# Patient Record
Sex: MALE | Race: OTHER
[De-identification: names, ages, dates, MRNs, and addresses within clinical notes are randomized per-mention and may not be internally consistent; named-entity substitution may affect disease eponyms.]

---

## 2018-08-25 VITALS
SYSTOLIC BLOOD PRESSURE: 178 MMHG | DIASTOLIC BLOOD PRESSURE: 77 MMHG | DIASTOLIC BLOOD PRESSURE: 77 MMHG | SYSTOLIC BLOOD PRESSURE: 178 MMHG

## 2018-08-25 NOTE — EKG
[facility]

              [address] [place] [address]

Test Date:    2018               Test Time:    17:39:42

Pat Name:     KIN HUNT       Department:   

Patient ID:   [id number]           Room:          

Gender:       M                        Technician:   CASIMIRO

:          1945               Requested By: AVA BOWDEN

Order Number: [id number]           Reading MD:   Benigno Lawson MD

                                 Measurements

Intervals                              Axis          

Rate:         65                       P:            48

DC:           202                      QRS:          27

QRSD:         84                       T:            62

QT:           398                                    

QTc:          415                                    

                           Interpretive Statements

SINUS RHYTHM

CONSISTENT WITH INFERIOR INFARCT



Electronically Signed On 2018 10:39:28 CDT by Benigno Lawson MD

## 2018-08-25 NOTE — PHYS DOC
Past Medical History


Past Medical History:  Diabetes-Type II, High Cholesterol, Hypertension


Past Surgical History:  Coronary Bypass Surgery, Other


Additional Past Surgical Histo:  Hernia, tooth extractions


Alcohol Use:  Occasionally


Drug Use:  None





Adult General


Chief Complaint


Chief Complaint:  Neck Pain





HPI


HPI





Patient is a 72  year old male who presents with neck pain to the left side is 

now shooting up into his head. The patient states that it is worse when he 

turns his head. They are concerned because he has a history of open-heart 

surgery and they state that his only complaint at that time was pain radiating 

up into the left side of his neck. He denies chest pain, shortness of breath or 

diaphoresis.





Review of Systems


Review of Systems





Constitutional: Denies fever or chills []


Respiratory: Denies cough or shortness of breath []


Cardiovascular: No additional information not addressed in HPI []


GI: Denies abdominal pain, nausea, vomiting, bloody stools or diarrhea []


: Denies dysuria or hematuria []


Musculoskeletal: See history of present illness


Integument: Denies rash or skin lesions []


Neurologic: Denies headache, focal weakness or sensory changes []


Endocrine: Denies polyuria or polydipsia []





All other systems were reviewed and found to be within normal limits, except as 

documented in this note.





Current Medications


Current Medications





Current Medications








 Medications


  (Trade)  Dose


 Ordered  Sig/[facility]  Start Time


 Stop Time Status Last Admin


Dose Admin


 


 Acetaminophen/


 Hydrocodone Bitart


  (Lortab 5/325)  1 tab  1X  ONCE  18 19:15


 18 19:16 DC 18 19:30


1 TAB


 


 Sodium Chloride  1,000 ml @ 


 1,000 mls/hr  1X  ONCE  18 18:30


 18 19:29 DC 18 19:06


1,000 MLS/HR











Allergies


Allergies





Allergies








Coded Allergies Type Severity Reaction Last Updated Verified


 


  No Known Drug Allergies    17 No











Physical Exam


Physical Exam





Constitutional: Well developed, well nourished, no acute distress, non-toxic 

appearance. []


Neck: Tenderness over left trapezius, no gross deformities noted,  point spinal 

tenderness noted


Cardiovascular:Heart rate regular rhythm, no murmur []


Lungs & Thorax:  Bilateral breath sounds clear to auscultation []


Abdomen: Bowel sounds normal, soft, no tenderness, no masses, no pulsatile 

masses. [] 


Skin: Warm, dry, no erythema, no rash. [] 


Neurologic: Alert and oriented X 3, normal motor function, normal sensory 

function, no focal deficits noted. []


Psychologic: Affect normal, judgement normal, mood normal. []





Current Patient Data


Vital Signs





 Vital Signs








  Date Time  Temp Pulse Resp B/P (MAP) Pulse Ox O2 Delivery O2 Flow Rate FiO2


 


18 19:30   20  97 Room Air  


 


18 16:54 97.9 68  178/77 (110)    





 97.9       








Lab Values





 Laboratory Tests








Test


 18


17:30


 


White Blood Count


 7.3 x10^3/uL


(4.0-11.0)


 


Red Blood Count


 3.96 x10^6/uL


(4.30-5.70)  L


 


Hemoglobin


 11.7 g/dL


(13.0-17.5)  L


 


Hematocrit


 35.1 %


(39.0-53.0)  L


 


Mean Corpuscular Volume


 89 fL ()





 


Mean Corpuscular Hemoglobin 30 pg (25-35)  


 


Mean Corpuscular Hemoglobin


Concent 33 g/dL


(31-37)


 


Red Cell Distribution Width


 14.5 %


(11.5-14.5)


 


Platelet Count


 227 x10^3/uL


(140-400)


 


Neutrophils (%) (Auto) 66 % (31-73)  


 


Lymphocytes (%) (Auto) 22 % (24-48)  L


 


Monocytes (%) (Auto) 9 % (0-9)  


 


Eosinophils (%) (Auto) 3 % (0-3)  


 


Basophils (%) (Auto) 1 % (0-3)  


 


Neutrophils # (Auto)


 4.8 x10^3uL


(1.8-7.7)


 


Lymphocytes # (Auto)


 1.6 x10^3/uL


(1.0-4.8)


 


Monocytes # (Auto)


 0.6 x10^3/uL


(0.0-1.1)


 


Eosinophils # (Auto)


 0.2 x10^3/uL


(0.0-0.7)


 


Basophils # (Auto)


 0.1 x10^3/uL


(0.0-0.2)


 


Sodium Level


 133 mmol/L


(136-145)  L


 


Potassium Level


 4.5 mmol/L


(3.5-5.1)


 


Chloride Level


 101 mmol/L


()


 


Carbon Dioxide Level


 22 mmol/L


(21-32)


 


Anion Gap 10 (6-14)  


 


Blood Urea Nitrogen


 32 mg/dL


(8-26)  H


 


Creatinine


 1.4 mg/dL


(0.7-1.3)  H


 


Estimated GFR


(Cockcroft-Gault) 49.5  





 


BUN/Creatinine Ratio 23 (6-20)  H


 


Glucose Level


 377 mg/dL


(70-99)  H


 


Calcium Level


 8.8 mg/dL


(8.5-10.1)


 


Total Bilirubin


 0.4 mg/dL


(0.2-1.0)


 


Aspartate Amino Transferase


(AST) 22 U/L (15-37)





 


Alanine Aminotransferase (ALT)


 44 U/L (16-63)





 


Alkaline Phosphatase


 66 U/L


()


 


Creatine Kinase


 167 U/L


()


 


Creatine Kinase MB (Mass)


 2.2 ng/mL


(0.0-3.6)


 


Creatine Kinase MB Relative


Index 1.3 % (0-4)  





 


Troponin I Quantitative


 < 0.017 ng/mL


(0.000-0.055)


 


Total Protein


 7.7 g/dL


(6.4-8.2)


 


Albumin


 3.6 g/dL


(3.4-5.0)


 


Albumin/Globulin Ratio


 0.9 (1.0-1.7)


L





 Laboratory Tests


18 17:30








 Laboratory Tests


18 17:30














EKG


EKG


[]





Radiology/Procedures


Radiology/Procedures


[]Signed





PATIENT: KIN HUNT ACCOUNT: [id number] MRN#: [id number]


: 1945 LOCATION: ER AGE: 72


SEX: M EXAM DT: 18 ACCESSION#: [id number]


STATUS: REG ER ORD. PHYSICIAN: AAV BOWDEN 


REASON: neck pain


PROCEDURE: CT CERVICAL SPINE WO CONTRAST





 


EXAM: Cervical spine CT without contrast.


 


HISTORY: Pain.


 


TECHNIQUE: Computed tomographic images of the cervical spine were obtained


without contrast. Multiplanar reformatting was performed. 


*One or more of the following individualized dose reduction techniques 


were utilized for this examination:  


1. Automated exposure control.  


2. Adjustment of the mA and/or kV according to patient size.  


3. Use of iterative reconstruction technique.


 


COMPARISON: None.


 


FINDINGS: There is cervical curvature due to thoracic scoliosis. There is 


3 mm anterolisthesis of C4 on C5, 2 mm retrolisthesis of C5 on C6 and 4 mm


anterolisthesis of C7 on T1. There is degenerative endplate remodeling 


with disc space narrowing and osteophytosis primarily at C5-C6 and C6-C7. 


There are multiple endplate Schmorl's nodes. There is advanced facet 


arthropathy at multiple levels. There is no suspicious osseous lesion. 


There is no acute or subacute fracture.


 


At C2-C3, there is a disc bulge. There is mild left facet arthropathy. 


There is mild left foraminal stenosis.


 


At C3-C4, there is a posterior central disc protrusion superimposed on a 


disc bulge and endplate remodeling. There is mild right and moderate left 


facet arthropathy. There is uncovertebral arthropathy. There is no 


stenosis.


 


At C4-C5, there is a posterior central disc protrusion superimposed on a 


disc bulge and endplate osteophytosis. There is mild right and moderate 


left facet arthropathy. There is uncovertebral arthropathy. There is mild 


left foraminal stenosis.


 


At C5-C6, there is a left paracentral disc protrusion superimposed on a 


disc bulge and endplate osteophytosis. There is mild left facet 


arthropathy. There is left greater than right uncovertebral therapy. There


is moderate left foraminal stenosis.


 


At C6-C7, there is a disc bulge and endplate osteophytosis. There is no 


stenosis.


 


IMPRESSION: 


1. Multilevel degenerative change within the cervical spine, resulting in 


stenosis as described above.


2. Cervical curvature due to thoracic scoliosis and mild multilevel 


listhesis.


3. No acute osseous finding.


 


Electronically signed by: Ophelia Haines MD (2018 6:51 PM) [facility]














DICTATED and SIGNED BY:     OPHELIA HAINES MD


DATE:     18 1849





Course & Med Decision Making


Course & Med Decision Making


Pertinent Labs and Imaging studies reviewed. (See chart for details)





[]The patient was given a dose of pain medication in the emergency department.





Dragon Disclaimer


Dragon Disclaimer


This electronic medical record was generated, in whole or in part, using a 

voice recognition dictation system.





Departure


Departure


Impression:  


 Primary Impression:  


 Neck pain


Disposition:  01 HOME, SELF-CARE


Condition:  STABLE


Referrals:  


ANALILIA LEVI MD (PCP)


Patient Instructions:  Degenerative Disk Disease





Additional Instructions:  


Take the pain medication as directed. Do not drive or operate heavy machinery 

while taking this medication. Follow-up with your primary care provider for 

possible referral to neurosurgery. If worsening return to the emergency 

department.


Scripts


Hydrocodone/Apap 5-325 (NORCO 5-325 TABLET) 1 Each Tablet


1 TAB PO PRN Q6HRS PRN for PAIN, #14 TAB 0 Refills


   Prov: AVA BOWDEN         18











AVA BOWDEN Aug 25, 2018 20:22

## 2018-08-25 NOTE — RAD
EXAM: Cervical spine CT without contrast.

 

HISTORY: Pain.

 

TECHNIQUE: Computed tomographic images of the cervical spine were obtained

without contrast. Multiplanar reformatting was performed. 

*One or more of the following individualized dose reduction techniques 

were utilized for this examination:  

1. Automated exposure control.  

2. Adjustment of the mA and/or kV according to patient size.  

3. Use of iterative reconstruction technique.

 

COMPARISON: None.

 

FINDINGS: There is cervical curvature due to thoracic scoliosis. There is 

3 mm anterolisthesis of C4 on C5, 2 mm retrolisthesis of C5 on C6 and 4 mm

anterolisthesis of C7 on T1. There is degenerative endplate remodeling 

with disc space narrowing and osteophytosis primarily at C5-C6 and C6-C7. 

There are multiple endplate Schmorl's nodes. There is advanced facet 

arthropathy at multiple levels. There is no suspicious osseous lesion. 

There is no acute or subacute fracture.

 

At C2-C3, there is a disc bulge. There is mild left facet arthropathy. 

There is mild left foraminal stenosis.

 

At C3-C4, there is a posterior central disc protrusion superimposed on a 

disc bulge and endplate remodeling. There is mild right and moderate left 

facet arthropathy. There is uncovertebral arthropathy. There is no 

stenosis.

 

At C4-C5, there is a posterior central disc protrusion superimposed on a 

disc bulge and endplate osteophytosis. There is mild right and moderate 

left facet arthropathy. There is uncovertebral arthropathy. There is mild 

left foraminal stenosis.

 

At C5-C6, there is a left paracentral disc protrusion superimposed on a 

disc bulge and endplate osteophytosis. There is mild left facet 

arthropathy. There is left greater than right uncovertebral therapy. There

is moderate left foraminal stenosis.

 

At C6-C7, there is a disc bulge and endplate osteophytosis. There is no 

stenosis.

 

IMPRESSION: 

1. Multilevel degenerative change within the cervical spine, resulting in 

stenosis as described above.

2. Cervical curvature due to thoracic scoliosis and mild multilevel 

listhesis.

3. No acute osseous finding.

 

Electronically signed by: Ophelia Britt MD (8/25/2018 6:51 PM) [facility]

## 2019-10-21 NOTE — RAD
EXAM: Renal sonogram.

 

HISTORY: Renal insufficiency.

 

TECHNIQUE: Sonographic imaging of the kidneys and bladder was performed.

 

COMPARISON: 5/18/2016.

 

FINDINGS: The right kidney measures 12.8 cm bpqd-lp-nmoa. The left kidney 

measures 12.8 cm lvzr-ju-ggua. No solid or cystic renal lesion is seen. 

There is no hydronephrosis. The urinary bladder is unremarkable. The post 

void bladder residual is 14 cc.

 

IMPRESSION:

1. Unremarkable kidneys.

2. Small post void bladder residual of 14 cc.

 

Electronically signed by: Ophelia Britt MD (10/21/2019 8:55 AM) [place] [id number]

## 2019-11-21 NOTE — CARD
MR#: [id number]

Account#: [id number]

Accession#: [id number]

Date of Study: 11/21/2019

Ordering Physician: МАРИНА BALDERAS, 

Referring Physician: МАРИНА BALDERAS, 

Tech: Alee Jimenez





--------------- APPROVED REPORT --------------





EXAM: Two-dimensional and M-mode echocardiogram with Doppler and color Doppler.



Other Information 

Quality : AverageHR: 67bpm

Technically limited study due to  body habitus.



INDICATION

CAD 



2D DIMENSIONS 

RVDd3.0 (2.9-3.5cm)IVSd1.3 (0.7-1.1cm)

Aortic Root(2D)2.6 (2.0-3.7cm)LVDd4.6 (3.9-5.9cm)

LVOT Diameter2.0 (1.8-2.4cm)PWd0.8 (0.7-1.1cm)

LVDs3.2 (2.5-4.0cm)FS (%) 32.1 %

SV60.2 mlLVEF(%)60.4 (>50%)



Aortic Valve

AoV Peak Jan.138.3cm/sAoV VTI27.8cm

AO Peak GR.7.6mmHgLVOT Peak Jan.106.1cm/s

LVOT  VTI 21.65cmAO Mean GR.5mmHg

EMERITA (VMAX)1.11op8VPY   (VTI)2.41cm2



Mitral Valve

MV E Weojxpck18.9cm/sMV DECEL [id number]

MV A Fgylorit87.5cm/sMV USQ09di

E/A  Ratio1.0MVA (PHT)3.17cm2



TDI

E/Lateral E'9.7E/Medial E'16.3



Pulmonary Valve

PV Peak Nvedgoqw96.6cm/sPV Peak Grad.4mmHg



Tricuspid Valve

TR P. Fixzfzrb550zh/sRAP BPOLLNTM9zfJb

TR Peak Gr.63yyVhFAPF66coMo



Pulmonary Vein

S1 Lrsasuzz44.1cm/sD2 Evlikohh33.5cm/s

PVa xcjuhqus033pruj



 LEFT VENTRICLE 

The left ventricle is normal size. There is mild septal left ventricular hypertrophy. The left ventri
cular systolic function is normal and the ejection fraction is within normal range. The Ejection Frac
tion is 60-65%. There is normal LV segmental wall motion. Transmitral Doppler flow pattern is Grade I
I-pseudonormal filling dynamics.



 RIGHT VENTRICLE 

The right ventricle is normal size. The right ventricular systolic function is normal.



 ATRIA 

The left atrium is mildly to moderately dilated. The right atrium size is normal. The interatrial sep
patric is intact with no evidence for an atrial septal defect or patent foramen ovale as noted on 2-D or
 Doppler imaging.



 AORTIC VALVE 

The aortic valve is mildly thickened but opens well. Doppler and Color Flow revealed no significant a
ortic regurgitation. There is no significant aortic valvular stenosis.



 MITRAL VALVE 

The mitral valve is thickened but opens well. There is no evidence of mitral valve prolapse. There is
 no mitral valve stenosis. Doppler and Color-flow revealed mild mitral regurgitation.



 TRICUSPID VALVE 

The tricuspid valve is normal in structure and function. Doppler and Color Flow revealed mild tricusp
id regurgitation with an estimated PAP of 31 mmHg. There is no tricuspid valve stenosis. 



 PULMONIC VALVE 

Doppler and Color Flow revealed no pulmonic valvular regurgitation. There is no pulmonic valvular judi
nosis.



 GREAT VESSELS 

The aortic root is normal in size. The ascending aorta is normal in size. The IVC is normal in size a
nd collapses >50% with inspiration.



 PERICARDIAL EFFUSION 

There is no pleural effusion. There is no evidence of significant pericardial effusion.



Critical Notification

Critical Value: No



<Conclusion>

The left ventricular systolic function is normal and the ejection fraction is within normal range. Th
e Ejection Fraction is 60-65%.

There is normal LV segmental wall motion.

Doppler and Color Flow revealed mild tricuspid regurgitation with an estimated PAP of 31 mmHg.



Signed by : Марина Balderas, 

Electronically Approved : 11/21/2019 11:26:18

## 2020-06-02 NOTE — RAD
MR#: [id number]

Account#: [id number]

Accession#: [id number]

Date of Study: 06/02/2020

Ordering Physician: МАРИНА BALDERAS, 

Referring Physician: МАРИНА BALDERAS, 

Tech: Nilson Garcia MBA, RDMS, RVT, RDCS, RTR





--------------- APPROVED REPORT --------------





Patient Location: OUT-PATIENT



Indications

AAA

htn



Duplex Results

A/PTransverseLongitudinal

Proximal Aorta                  2.4cm1.3cm

Mid Aorta                       1.7cm1.7cm

Distal Aorta                    1.8cm1.2cm

Rt. Common Iliac Artery1.2cm

Lt. Common Iliac Artery        .8cm



Doppler

                                VelocityWaveform

Proximal Aorta                  115.0 cm/sec

Aorta Mid.                      185.0 cm/sec

Distal Aorta                    189.0 cm/sec



Findings

Technically limited study.  Grossly no obvious evidence of abdominal aortic aneurysm.  Measurements a
s noted above.  Mildly elevated velocities in the aorta but no focal obstruction identified.



Critical Notification

Critical Value: No



<Conclusion>

1.  No significant abdominal aortic aneurysm noted.



Signed by : Марина Balderas, 

Electronically Approved : 06/02/2020 10:52:23

## 2020-11-19 ENCOUNTER — HOSPITAL ENCOUNTER (OUTPATIENT)
Dept: HOSPITAL 61 - ECHO | Age: 75
End: 2020-11-19
Attending: INTERNAL MEDICINE
Payer: COMMERCIAL

## 2020-11-19 DIAGNOSIS — I11.9: ICD-10-CM

## 2020-11-19 DIAGNOSIS — I65.23: Primary | ICD-10-CM

## 2020-11-19 DIAGNOSIS — I77.3: ICD-10-CM

## 2020-11-19 DIAGNOSIS — Q25.46: ICD-10-CM

## 2020-11-19 PROCEDURE — A9500 TC99M SESTAMIBI: HCPCS

## 2020-11-19 PROCEDURE — 78452 HT MUSCLE IMAGE SPECT MULT: CPT

## 2020-11-19 PROCEDURE — 93306 TTE W/DOPPLER COMPLETE: CPT

## 2020-11-19 PROCEDURE — 93880 EXTRACRANIAL BILAT STUDY: CPT

## 2020-11-19 PROCEDURE — 93017 CV STRESS TEST TRACING ONLY: CPT

## 2020-11-19 NOTE — CARD
MR#: [id number]

Account#: [id number]

Accession#: [id number]

Date of Study: 11/19/2020

Ordering Physician: МАРИНА BALDERAS, 

Referring Physician: МАРИНА BALDERAS, 

Tech: Carolina Stiles





--------------- APPROVED REPORT --------------





EXAM: Two-dimensional and M-mode echocardiogram with Doppler and color Doppler.



Other Information 

Quality : AverageHR: 62bpm



INDICATION

Cardiac Disease: CAD 



RISK FACTORS

Hypertension 

Hyperlipidemia

Diabetes



2D DIMENSIONS 

Left Atrium(2D)4.0 (1.6-4.0cm)IVSd1.2 (0.7-1.1cm)

Aortic Root(2D)3.2 (2.0-3.7cm)LVDd4.5 (3.9-5.9cm)

LVOT Diameter2.0 (1.8-2.4cm)PWd1.1 (0.7-1.1cm)

LVDs2.8 (2.5-4.0cm)FS (%) 37.4 %

SV61.4 mlLVEF(%)67.6 (>50%)



Aortic Valve

AoV Peak Jan.125.0cm/sAoV VTI25.6cm

AO Peak GR.6.3mmHgLVOT Peak Jan.92.1cm/s

LVOT  VTI 21.64cmAO Mean GR.4mmHg

EMERITA (VMAX)1.49wd9VJN   (VTI)2.62cm2



Mitral Valve

MV E Eineqaco38.5cm/sMV DECEL WVAN202wv

MV A Bqwwpymh10.5cm/sMV UHZ36ut

E/A  Ratio0.8MVA (PHT)3.72cm2



TDI

E/Lateral E'9.7E/Medial E'18.1



Pulmonary Valve

PV Peak Ayrjkbbf305.0cm/sPV Peak Grad.5mmHg



Tricuspid Valve

TR P. Wqruscbr082rl/sRAP EESWVHRT6awJf

TR Peak Gr.51xhLdZDOK71rrPn



Pulmonary Vein

S1 Nmmyqkxc08.3cm/sD2 Yiyjaxwl60.7cm/s

PVa djmvsulc772dunn



 LEFT VENTRICLE 

The left ventricle is normal size. There is borderline to mild concentric left ventricular hypertroph
y. The left ventricular systolic function is normal. The Ejection Fraction is 55-60%. There is normal
 LV segmental wall motion. Transmitral Doppler flow pattern is Grade I-abnormal relaxation pattern.



 RIGHT VENTRICLE 

The right ventricle is normal size. There is normal right ventricular wall thickness. The right ventr
icular systolic function is normal.



 ATRIA 

The left atrium size is normal. The right atrium size is normal. The interatrial septum is intact wit
h no evidence for an atrial septal defect or patent foramen ovale as noted on 2-D or Doppler imaging.




 AORTIC VALVE 

The aortic valve is thickened but opens well. Doppler and Color Flow revealed no significant aortic r
egurgitation. There is no significant aortic valvular stenosis. Calculated aortic valve area is 2.58 
cm2 with maximum pressure gradient of 8 mmHg and mean pressure gradient of 4 mmHg.



 MITRAL VALVE 

The mitral valve is normal in structure and function. There is no evidence of mitral valve prolapse. 
There is no mitral valve stenosis. Doppler and Color-flow revealed trace mitral regurgitation.



 TRICUSPID VALVE 

The tricuspid valve is normal in structure and function. Doppler and Color Flow revealed trace tricus
pid regurgitation with an estimated PAP of 33 mmHg. There is no tricuspid valve stenosis.



 PULMONIC VALVE 

The pulmonic valve is not well visualized. Doppler and Color Flow revealed trace pulmonic valvular re
gurgitation.



 GREAT VESSELS 

The aortic root is normal in size. The IVC was not well visualized.



 PERICARDIAL EFFUSION 

There is no evidence of significant pericardial effusion.



Critical Notification

Critical Value: No



<Conclusion>

The left ventricular systolic function is normal.

The Ejection Fraction is 55-60%.

There is normal LV segmental wall motion.

Transmitral Doppler flow pattern is Grade I-abnormal relaxation pattern.

Trace mitral regurgitation.

Trace tricuspid regurgitation with an estimated PAP of 33 mmHg.

There is no evidence of significant pericardial effusion.



Signed by : Steven Gupta, 

Electronically Approved : 11/19/2020 16:26:06

## 2020-11-19 NOTE — RAD
MR#: [id number]

Account#: [id number]

Accession#: [id number]

Date of Study: 11/19/2020

Ordering Physician: МАРИНА BALDERAS, 

Referring Physician: KYLE ARRINGTON Tech: RT CARMELITA Camarillo) (N)





--------------- APPROVED REPORT --------------





Test Type:          Pharmacological

Stress Nurse/Tech: Cassie Weller RN

Test Indications: CAD

Cardiac History: Hypertension, Diabetes,CAD

Medications:     See Electronic Medical Record

Medical History: See Electronic Medical Record

Resting ECG:     SR

Resting Heart Rate: 60 bpm

Resting Blood Pressure: 161/68mmHg

Pretest Chest Pain: No chest pain



Nurse/Tech Notes

S1,S2 and lungs clear to auscultation.

Consent: The procedure was explained to the patient in lay terms. Informed consent was witnessed. Erwin
eout was entered into [employer]. History and Stress Test performed by RT Rabia (R) (N)



Pharm. Details

Pharmacologic stress testing was performed using 0.4mg per 5ml of regadenoson given intravenously ove
r 7-10 seconds.



Stress Symptoms

Dyspnea



POST EXERCISE

Reason for Termination: Infusion complete

Target HR: Yes

Max HR: 144 bpm

119% of Maximum Predicted HR: 121 bpm

Max Blood Pressure: 161/68mmHg

Blood Pressure response to exercise: Normal blood pressure response during stress.

Heart Rate response to exercise: WNL

Chest Pain: No. 

Arrhythmia: No. 

ST Change: No. 



INTERPRETATION

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  No ischemic morris
es at peak stress.  No arrhythmias.



Imaging Protocol

IMAGE PROTOCOL: Rest Tc-99m/stress Tc-99m 1 day



Rest:            Stress:         Viability:   

Radiopharm.Tc99m YpnkaaolnHk53s Sestamibi

Dose10.6mCi            32mCi            

Duration    15min.           10min.           

Img Date  11/19/2020 11/19/2020      

Inj-Img Glvk51dec.           60min.           



Rest Admin Site:IV - Right HandAdministrator:MICHAEL Ibrahim, ARRT (R)(N)

Stress Admin Site: IV - Right HandAdministrator: MICHAEL Ibrahim, ARRT (R)(N)



STRESS DATA

End Diast. Vol.141.0mlAv. Heart Rate66.0bpm

End Syst. Vol.61.0mlCO Index BSA0.0L/min

Myocardial Iolg027.0gEject. Gauxiehx77.0%



Stress Rates

Pk. Fill Rate2.32EDV/secLVtime Pk. Fill 242.42msec

Pk. Empty Rate2.73ESV/secLVtime Pk. Ifmno108.45msec

1/3 Pk. Fill0.38EDV/sec



Stress Scores

Regional WT1.00Summed WT9.00

Regional WM0.00Summed WM15.00



LV Perfusion

Scintigraphic images moderate fixed defect involving the inferolateral wall consistent with previous 
myocardial infarction without any reversibility.



Wall Motion

Abnormal septal motion most probably secondary to postoperative state.  The ejection fraction is calc
ulated at 48%.



LV Perf. Quant

17 Seg. SSS14.00

17 Seg. SRS12.00

17 Seg. SDS3.00

Stress Defect Extent (% LAD)15.60Rest Defect Extent (% LAD)11.30Rev. Defect Extent (% LAD)0.00

Stress Defect Extent (% LCX) 42.50Rest Defect Extent (% LCX)53.80Rev. Defect Extent (% LCX)0.00

Stress Defect Extent (% RCA)25.60Rest Defect Extent (% RCA)20.00Rev. Defect Extent (% RCA)0.00

Stress Defect Extent (% CASSIE)25.00Rest Defect Extent (% CASSIE)25.20Rev. Defect Extent (% CASISE)0.00



Conclusion

1. Regadenoson cardioisotope stress test showed moderate sized inferolateral wall infarct without any
 ischemia.

2. Abnormal septal motion most probably secondary to postoperative state.  The ejection fraction is c
alculated at 48%.

3. Low risk for cardiac events.



Signed by : Steven Gupta, 

Electronically Approved : 11/19/2020 16:08:03

## 2020-11-19 NOTE — RAD
MR#: [id number]

Account#: [id number]

Accession#: [id number]

Date of Study: 11/19/2020

Ordering Physician: МАРИНА BALDERAS,

Referring Physician: МАРИНА BALDERAS,

Tech: Carolina Roe, OLGA, RVT, RTR





--------------- APPROVED REPORT --------------





Patient Location: OUT-PATIENT

Laterality:Bilateral



Indications

Carotid Artery Disease



Doppler Spectral Velocity Analysis

Right   Left    

pCCA     152/18 cm/spCCA     165/24 cm/s

mCCA     102/13 cm/smCCA     124/17 cm/s

dCCA     105/18 cm/sdCCA     118/16 cm/s

Bulb     93/15 cm/sBulb     131/21 cm/s

ECA      143/10 cm/sECA      108/10 cm/s

pICA     91/20 cm/spICA     109/24 cm/s

Jordy     93/26 cm/smICA     114/25 cm/s

dICA     113/24 cm/sdICA     102/25 cm/s

Vert.    17/5 cm/sVert.    61/17 cm/s

ICA/CCA  1.00ICA/CCA  0.91



Findings

Grayscale images demonstrate bilateral intimal hyperplasia involving the common carotid, external and
 internal carotid vessels.  Grayscale images demonstrate mild plaque diffusely.



Based on velocity criteria there is moderate stenosis involving the common carotid arteries but this 
is likely related to tortuosity rather than true plaque obstruction.



Overall 0 to less than 50% stenosis based on velocity criteria in the internal carotid arteries bilat
erally.



The right vertebral artery is not well visualized and may either be occluded or severely diseased.  T
he left vertebral artery demonstrates normal vertebral velocities in an antegrade fashion.



Critical Notification

Critical Value: No



<Conclusion>

1.  No significant internal carotid artery disease bilaterally

2.  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

3.  Tortuous proximal common carotid arteries but no clear obstruction evident.



Signed by : Марина Balderas, 

Electronically Approved : 11/19/2020 10:12:37

## 2020-11-30 ENCOUNTER — HOSPITAL ENCOUNTER (OUTPATIENT)
Dept: HOSPITAL 61 - LAB | Age: 75
End: 2020-11-30
Attending: INTERNAL MEDICINE
Payer: COMMERCIAL

## 2020-11-30 DIAGNOSIS — E11.21: ICD-10-CM

## 2020-11-30 DIAGNOSIS — I12.9: Primary | ICD-10-CM

## 2020-11-30 DIAGNOSIS — D64.9: ICD-10-CM

## 2020-11-30 DIAGNOSIS — N18.2: ICD-10-CM

## 2020-11-30 DIAGNOSIS — E11.22: ICD-10-CM

## 2020-11-30 DIAGNOSIS — N17.9: ICD-10-CM

## 2020-11-30 DIAGNOSIS — E55.9: ICD-10-CM

## 2020-11-30 DIAGNOSIS — R80.9: ICD-10-CM

## 2020-11-30 LAB
ALBUMIN SERPL-MCNC: 3.3 G/DL (ref 3.4–5)
ANION GAP SERPL CALC-SCNC: 12 MMOL/L (ref 6–14)
BUN SERPL-MCNC: 24 MG/DL (ref 8–26)
CALCIUM SERPL-MCNC: 8.8 MG/DL (ref 8.5–10.1)
CHLORIDE SERPL-SCNC: 107 MMOL/L (ref 98–107)
CO2 SERPL-SCNC: 27 MMOL/L (ref 21–32)
CREAT SERPL-MCNC: 1.2 MG/DL (ref 0.7–1.3)
GFR SERPLBLD BASED ON 1.73 SQ M-ARVRAT: 58.7 ML/MIN
GLUCOSE SERPL-MCNC: 110 MG/DL (ref 70–99)
PHOSPHATE SERPL-MCNC: 3.6 MG/DL (ref 2.6–4.7)
POTASSIUM SERPL-SCNC: 3.8 MMOL/L (ref 3.5–5.1)
SODIUM SERPL-SCNC: 146 MMOL/L (ref 136–145)

## 2020-11-30 PROCEDURE — 80069 RENAL FUNCTION PANEL: CPT

## 2020-11-30 PROCEDURE — 36415 COLL VENOUS BLD VENIPUNCTURE: CPT

## 2021-01-17 ENCOUNTER — HOSPITAL ENCOUNTER (EMERGENCY)
Dept: HOSPITAL 61 - ER | Age: 76
Discharge: HOME | End: 2021-01-17
Payer: COMMERCIAL

## 2021-01-17 VITALS — SYSTOLIC BLOOD PRESSURE: 190 MMHG | DIASTOLIC BLOOD PRESSURE: 79 MMHG

## 2021-01-17 VITALS — BODY MASS INDEX: 27.64 KG/M2 | HEIGHT: 66 IN | WEIGHT: 171.96 LBS

## 2021-01-17 DIAGNOSIS — I10: ICD-10-CM

## 2021-01-17 DIAGNOSIS — R20.2: ICD-10-CM

## 2021-01-17 DIAGNOSIS — E78.00: ICD-10-CM
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
## 2021-01-17 DIAGNOSIS — Z98.890: ICD-10-CM
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
## 2021-01-17 DIAGNOSIS — S29.011A: Primary | ICD-10-CM

## 2021-01-17 DIAGNOSIS — F17.200: ICD-10-CM
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
## 2021-01-17 DIAGNOSIS — E11.9: ICD-10-CM

## 2021-01-17 LAB
% BANDS: 7 % (ref 0–9)
% LYMPHS: 6 % (ref 24–48)
% MONOS: 6 % (ref 0–10)
% SEGS: 81 % (ref 35–66)
ALBUMIN SERPL-MCNC: 3.3 G/DL (ref 3.4–5)
ALBUMIN/GLOB SERPL: 1 {RATIO} (ref 1–1.7)
ALP SERPL-CCNC: 67 U/L (ref 46–116)
ALT SERPL-CCNC: 97 U/L (ref 16–63)
ANION GAP SERPL CALC-SCNC: 14 MMOL/L (ref 6–14)
AST SERPL-CCNC: 50 U/L (ref 15–37)
BASOPHILS # BLD AUTO: 0.1 X10^3/UL (ref 0–0.2)
BASOPHILS NFR BLD: 0 % (ref 0–3)
BILIRUB SERPL-MCNC: 0.7 MG/DL (ref 0.2–1)
BUN SERPL-MCNC: 21 MG/DL (ref 8–26)
BUN/CREAT SERPL: 14 (ref 6–20)
CALCIUM SERPL-MCNC: 8.8 MG/DL (ref 8.5–10.1)
CHLORIDE SERPL-SCNC: 99 MMOL/L (ref 98–107)
CO2 SERPL-SCNC: 24 MMOL/L (ref 21–32)
CREAT SERPL-MCNC: 1.5 MG/DL (ref 0.7–1.3)
EOSINOPHIL NFR BLD: 0 % (ref 0–3)
EOSINOPHIL NFR BLD: 0 X10^3/UL (ref 0–0.7)
ERYTHROCYTE [DISTWIDTH] IN BLOOD BY AUTOMATED COUNT: 14.4 % (ref 11.5–14.5)
GFR SERPLBLD BASED ON 1.73 SQ M-ARVRAT: 45.4 ML/MIN
GLUCOSE SERPL-MCNC: 310 MG/DL (ref 70–99)
HCT VFR BLD CALC: 37.4 % (ref 39–53)
HGB BLD-MCNC: 12.7 G/DL (ref 13–17.5)
LYMPHOCYTES # BLD: 1.1 X10^3/UL (ref 1–4.8)
LYMPHOCYTES NFR BLD AUTO: 7 % (ref 24–48)
MAGNESIUM SERPL-MCNC: 1.6 MG/DL (ref 1.8–2.4)
MCH RBC QN AUTO: 31 PG (ref 25–35)
MCHC RBC AUTO-ENTMCNC: 34 G/DL (ref 31–37)
MCV RBC AUTO: 92 FL (ref 79–100)
MONO #: 1.2 X10^3/UL (ref 0–1.1)
MONOCYTES NFR BLD: 8 % (ref 0–9)
NEUT #: 12.8 X10^3/UL (ref 1.8–7.7)
NEUTROPHILS NFR BLD AUTO: 84 % (ref 31–73)
PLATELET # BLD AUTO: 156 X10^3/UL (ref 140–400)
PLATELET # BLD EST: ADEQUATE 10*3/UL
POTASSIUM SERPL-SCNC: 3.9 MMOL/L (ref 3.5–5.1)
PROT SERPL-MCNC: 6.7 G/DL (ref 6.4–8.2)
RBC # BLD AUTO: 4.09 X10^6/UL (ref 4.3–5.7)
SODIUM SERPL-SCNC: 137 MMOL/L (ref 136–145)
WBC # BLD AUTO: 15.2 X10^3/UL (ref 4–11)

## 2021-01-17 PROCEDURE — 71045 X-RAY EXAM CHEST 1 VIEW: CPT

## 2021-01-17 PROCEDURE — 85025 COMPLETE CBC W/AUTO DIFF WBC: CPT

## 2021-01-17 PROCEDURE — 85007 BL SMEAR W/DIFF WBC COUNT: CPT

## 2021-01-17 PROCEDURE — 84484 ASSAY OF TROPONIN QUANT: CPT

## 2021-01-17 PROCEDURE — 83735 ASSAY OF MAGNESIUM: CPT

## 2021-01-17 PROCEDURE — 36415 COLL VENOUS BLD VENIPUNCTURE: CPT

## 2021-01-17 PROCEDURE — 85379 FIBRIN DEGRADATION QUANT: CPT

## 2021-01-17 PROCEDURE — 93005 ELECTROCARDIOGRAM TRACING: CPT

## 2021-01-17 PROCEDURE — 80053 COMPREHEN METABOLIC PANEL: CPT

## 2021-01-17 PROCEDURE — 99285 EMERGENCY DEPT VISIT HI MDM: CPT

## 2021-01-17 PROCEDURE — 96374 THER/PROPH/DIAG INJ IV PUSH: CPT

## 2021-01-17 PROCEDURE — 71275 CT ANGIOGRAPHY CHEST: CPT

## 2021-01-17 PROCEDURE — 96361 HYDRATE IV INFUSION ADD-ON: CPT

## 2021-01-17 NOTE — RAD
Study: CT CHEST WITH CONTRAST - PULMONARY ANGIOGRAM



History: Right-sided chest pain. Pulmonary embolism.



Comparison: None.



Technique:  Helical CT of the chest performed after the administration of 80 cc Omnipaque 350 intrave
nous contrast and timed for angiographic evaluation of the pulmonary arteries per PE protocol. Corona
l and sagittal 3D MIP reformations were obtained.



One or more of the following individualized dose reduction techniques were utilized for this examinat
ion:  



1. Automated exposure control

2. Adjustment of the mA and/or kV according to patient size

3. Use of iterative reconstruction technique.



Findings: 



Pulmonary Arteries: No main, lobar or segmental pulmonary embolism. Upper limits of normal main pulmo
nary artery caliber at 3 cm.



Heart/Systemic Vasculature: Extensive calcified and noncalcified atheromatous plaque to include invol
vement of the coronary arteries. Status post coronary artery bypass. No CT manifestations of overt ri
ght heart strain. The left vertebral artery originates from the arch and is well opacified. The right
 vertebral artery is not able to be visualized.



Mediastinum: Scattered granulomas. Prominent subcarinal lymph node without internal mineralization me
asures 1.5 cm short axis.



Lungs: Scattered atelectasis. There may be some mild subpleural fibrosis. No suspicious pulmonary nod
ule based on size. No pleural effusion or pneumothorax.



Neck/Axilla/Body Wall: No significant abnormality.



Upper Abdomen: The visualized gallbladder is mildly distended but without apparent wall thickening.



Bones: Scattered degenerative changes with involvement of the lower cervical spine more so than the t
horacic spine. No rib fracture is identified to account for the patient's symptoms.



Miscellaneous: None.



IMPRESSION: 



1.  No pulmonary embolism or other acute abnormality is identified to explain the patient's chest lucie
n.

2.  Scattered atelectasis and some potential subpleural fibrosis. No confluent infiltrate to suggest 
pneumonia.

3.  Prominent subcarinal lymph node measuring up to 1.5 cm short axis. This is indeterminant but a re
active etiology is favored given the absence of lymph node enlargement elsewhere.

4.  Extensive atheromatous plaque without aortic dissection or aneurysmal dilatation. The left verteb
ral artery originates from the arch and is well opacified. The proximal portion of the right vertebra
l artery is not able to be delineated. This could be chronic/due to nondominance though correlate for
 any symptoms in this vascular distribution.



Electronically signed by: JENNIFER VIZCARRA MD (1/17/2021 12:10 PM) [facility]

## 2021-01-17 NOTE — EKG
[facility]

              [address] [place] [address]

Test Date:    2021               Test Time:    09:09:55

Pat Name:     KIN HUNT       Department:   

Patient ID:   [id number]           Room:          

Gender:       M                        Technician:   

:          [id number]               Requested By: LILI CONROY

Order Number: [id number]           Reading MD:     

                                 Measurements

Intervals                              Axis          

Rate:         82                       P:            49

VT:           192                      QRS:          57

QRSD:         94                       T:            52

QT:           358                                    

QTc:          421                                    

                           Interpretive Statements

SINUS RHYTHM

QRS(T) CONTOUR ABNORMALITY

CONSISTENT WITH INFERIOR INFARCT

PROBABLY OLD

ABNORMAL ECG

RI6.02

No previous ECG available for comparison

## 2021-01-17 NOTE — PHYS DOC
Past Medical History


Past Medical History:  Diabetes-Type II, High Cholesterol, Hypertension


Past Surgical History:  Coronary Bypass Surgery, Other


Additional Past Surgical Histo:  Hernia, tooth extractions


Smoking Status:  Former Smoker


Alcohol Use:  Occasionally


Drug Use:  None





Adult General


Chief Complaint


Chief Complaint:  CHEST PAIN





HPI


HPI





Patient is a 75  year old male with an extensive past medical history including 

significant cardiovascular and endovascular disease now presenting emergency 

department complaining of new onset of chest pain.  Patient states he has been 

having the same chest pain for approximately 3 months described as an aching 

pain in the right lateral chest which is worse on inspiration.  Patient states 

that an aching sensation.  Denies any changes with the last few weeks.  Cannot 

tell me why he decided to come in this morning.  Does states that he has been 

seen by his cardiologist over the last week for the same symptoms and work-up is

negative.  Denies any dizziness, headaches, fever, chills, cough, nausea or 

vomiting





Review of Systems


Review of Systems





Constitutional: Denies fever or chills []


Eyes: Denies change in visual acuity, redness, or eye pain []


HENT: Denies nasal congestion or sore throat []


Respiratory: Denies cough or shortness of breath []


Cardiovascular: No additional information not addressed in HPI []


GI: Denies abdominal pain, nausea, vomiting, bloody stools or diarrhea []


:  Denies dysuria or hematuria []


Musculoskeletal: Denies back pain or joint pain []


Integument: Denies rash or skin lesions []


Neurologic: Denies headache, focal weakness or sensory changes []


Endocrine: Denies polyuria or polydipsia []





All other systems were reviewed and found to be within normal limits, except as 

documented in this note.





Current Medications


Current Medications





Current Medications








 Medications


  (Trade)  Dose


 Ordered  Sig/Jillian  Start Time


 Stop Time Status Last Admin


Dose Admin


 


 Info


  (CONTRAST GIVEN


 -- Rx MONITORING)  1 each  PRN DAILY  PRN  1/17/21 11:45


 1/19/21 11:44   





 


 Iohexol


  (Omnipaque 350


 Mg/ml)  80 ml  1X  ONCE  1/17/21 11:30


 1/17/21 11:31 DC 1/17/21 11:50


80 ML


 


 Morphine Sulfate


  (Morphine


 Sulfate)  4 mg  1X  ONCE  1/17/21 11:00


 1/17/21 11:01 DC 1/17/21 11:12


4 MG


 


 Sodium Chloride  500 ml @ 


 500 mls/hr  1X  ONCE  1/17/21 10:15


 1/17/21 11:14 DC 1/17/21 10:14


500 MLS/HR











Allergies


Allergies





Allergies








Coded Allergies Type Severity Reaction Last Updated Verified


 


  No Known Drug Allergies    5/24/17 No











Physical Exam


Physical Exam





Constitutional: Well developed, well nourished, no acute distress, non-toxic 

appearance. []


HENT: Normocephalic, atraumatic, bilateral external ears normal, oropharynx 

moist, no oral exudates, nose normal. []


Eyes: PERRLA, EOMI, conjunctiva normal, no discharge. [] 


Neck: Normal range of motion, no tenderness, supple, no stridor. [] 


Cardiovascular:Heart rate regular rhythm, no murmur


Lungs & Thorax:  Bilateral breath sounds clear to auscultation.  Right anterior 

chest wall tenderness over the right fifth and sixth intercostal space


Abdomen: Bowel sounds normal, soft, no tenderness, no masses, no pulsatile 

masses. [] 


Skin: Warm, dry, no erythema, no rash. [] 


Back: No tenderness, no CVA tenderness. [] 


Extremities: No tenderness, no cyanosis, no clubbing, ROM intact, no edema. [] 


Neurologic: Alert and oriented X 3, normal motor function, normal sensory 

function, no focal deficits noted. []


Psychologic: Affect normal, judgement normal, mood normal. []





Current Patient Data


Vital Signs





                                   Vital Signs








  Date Time  Temp Pulse Resp B/P (MAP) Pulse Ox O2 Delivery O2 Flow Rate FiO2


 


1/17/21 11:12   18  94 Room Air  


 


1/17/21 10:11  74  190/79 (116)    


 


1/17/21 09:16 98.2       





 98.2       








Lab Values





                                Laboratory Tests








Test


 1/17/21


09:12


 


White Blood Count


 15.2 x10^3/uL


(4.0-11.0)  H


 


Red Blood Count


 4.09 x10^6/uL


(4.30-5.70)  L


 


Hemoglobin


 12.7 g/dL


(13.0-17.5)  L


 


Hematocrit


 37.4 %


(39.0-53.0)  L


 


Mean Corpuscular Volume


 92 fL ()





 


Mean Corpuscular Hemoglobin 31 pg (25-35)  


 


Mean Corpuscular Hemoglobin


Concent 34 g/dL


(31-37)


 


Red Cell Distribution Width


 14.4 %


(11.5-14.5)


 


Platelet Count


 156 x10^3/uL


(140-400)


 


Neutrophils (%) (Auto) 84 % (31-73)  H


 


Lymphocytes (%) (Auto) 7 % (24-48)  L


 


Monocytes (%) (Auto) 8 % (0-9)  


 


Eosinophils (%) (Auto) 0 % (0-3)  


 


Basophils (%) (Auto) 0 % (0-3)  


 


Neutrophils # (Auto)


 12.8 x10^3/uL


(1.8-7.7)  H


 


Lymphocytes # (Auto)


 1.1 x10^3/uL


(1.0-4.8)


 


Monocytes # (Auto)


 1.2 x10^3/uL


(0.0-1.1)  H


 


Eosinophils # (Auto)


 0.0 x10^3/uL


(0.0-0.7)


 


Basophils # (Auto)


 0.1 x10^3/uL


(0.0-0.2)


 


Segmented Neutrophils % 81 % (35-66)  H


 


Band Neutrophils % 7 % (0-9)  


 


Lymphocytes % 6 % (24-48)  L


 


Monocytes % 6 % (0-10)  


 


Platelet Estimate


 Adequate


(ADEQUATE)


 


D-Dimer ([place])


 2.43 ug/mlFEU


(0.00-0.50)  H


 


Sodium Level


 137 mmol/L


(136-145)


 


Potassium Level


 3.9 mmol/L


(3.5-5.1)


 


Chloride Level


 99 mmol/L


()


 


Carbon Dioxide Level


 24 mmol/L


(21-32)


 


Anion Gap 14 (6-14)  


 


Blood Urea Nitrogen


 21 mg/dL


(8-26)


 


Creatinine


 1.5 mg/dL


(0.7-1.3)  H


 


Estimated GFR


(Cockcroft-Gault) 45.4  





 


BUN/Creatinine Ratio 14 (6-20)  


 


Glucose Level


 310 mg/dL


(70-99)  H


 


Calcium Level


 8.8 mg/dL


(8.5-10.1)


 


Magnesium Level


 1.6 mg/dL


(1.8-2.4)  L


 


Total Bilirubin


 0.7 mg/dL


(0.2-1.0)


 


Aspartate Amino Transferase


(AST) 50 U/L (15-37)


H


 


Alanine Aminotransferase (ALT)


 97 U/L (16-63)


H


 


Alkaline Phosphatase


 67 U/L


()


 


Troponin I Quantitative


 0.021 ng/mL


(0.000-0.055)


 


Total Protein


 6.7 g/dL


(6.4-8.2)


 


Albumin


 3.3 g/dL


(3.4-5.0)  L


 


Albumin/Globulin Ratio 1.0 (1.0-1.7)  





                                Laboratory Tests


1/17/21 09:12








                                Laboratory Tests


1/17/21 09:12











EKG


EKG


[]





Radiology/Procedures


Radiology/Procedures


[]





Course & Med Decision Making


Course & Med Decision Making


Pertinent Labs and Imaging studies reviewed. (See chart for details)





75-year-old male presenting with nonspecific right anterior chest pain.  Patient

 does have some pleurisy which does raise concern for pulmonary embolism but the

 patient currently does not have any other significant risk factors.  He has the

 patient is a 10 obtain D-dimer.  In addition will obtain ACS work-up although I

 feel that if the patient has been having this pain for 3 months he has no 

significant findings on labs and EKG be safely discharged.





Labs obtained and without any significant evidence of severe underlying 

etiology.  This is negative.  CT angiogram of the chest without any evidence of 

pulmonary embolism.  At this time feel patient can be safely discharged.





I spoken with the patient and her caregivers.  I explained the patient's 

condition, diagnoses and treatment plan based on the information available to me

 at this time.  I have answered the patient and her caregiver's questions and 

addressed any concerns.  The patient and her caregivers have a good 

understanding of patient's diagnosis, condition and treatment plan as can be 

expected at this point.  Vital signs have been stable.  Patient's condition is 

stable and appropriate for discharge from the emergency department. 





Patient will pursue further outpatient evaluation with primary care physician or

 other designated or consulting physician as outlined in the discharge 

instructions.  The patient and/or caregivers are agreeable to this plan of care 

and follow-up instructions have been explained in detail.  The patient and/or 

caregivers have received these instructions in written form and have expressed 

an understanding of the discharge instructions.  The patient and/or caregivers 

are aware that any significant change of condition or worsening of symptoms 

should prompt immediate return to this or the closest emergency department or 

call to 911.





Julien Disclaimer


Dragon Disclaimer


This electronic medical record was generated, in whole or in part, using a voice

 recognition dictation system.





Departure


Departure


Impression:  


   Primary Impression:  


   Chest wall muscle strain


Disposition:  01 DC HOME SELF CARE/HOMELESS


Referrals:  


GABRIELLA RASHID MD (PCP)


Patient Instructions:  Chest Pain (Nonspecific)





Additional Instructions:  


EMERGENCY DEPARTMENT GENERAL DISCHARGE INSTRUCTIONS





Thank you for coming to [facility] Emergency Department (ED) 

today and 


trusting us with you care.  We trust that you had a positive experience in our 

Emergency 


Department.  If you wish to speak to the department management, you may call the

 Director at 


[phone].





YOUR FOLLOW UP INSTRUCTIONS ARE AS FOLLOWS:





1.  Do you have a private Doctor?  If you do not have a private doctor, please 

ask for a 


resource list of physicians or clinics that may be able to assist you with 

follow up care.





2.  The Emergency Physicain has interpreted your x-rays.  The X-Ray specialist 

will also 


review them.  If there is a change in the findings, you will be notified in 48 

hours when at 


all possible.





3.  A lab test or culture has been done, your results will be reviewed and you 

will be 


notified if you need a change in treatment.





ADDITIONAL INSTRUCTIONS AND INFORMATION:





1.  Your care today has been supervised by a physician who is specially trained 

in emergency 


care.  Many problems require more than one evaluation for a complete diagnosis 

and 


treatment.  We recommend that you schedule your follow up appointment as 

recommended to 


ensure complete treatment of you illness or injury.  If you are unable to obtain

 follow up 


care and continue to have a problem, or if your condition worsens, we recommend 

that you 


return to the ED.





2.  We are not able to safely determine your condition over the phone nor are we

 able to 


give sound medical advice over the phone.  For these safety reasons, if you call

 for medical 


advice we will ask you to come to the ED for further evaluation.





3.  If you have any questions regarding these discharge instructions please call

 the ED at 


[phone].





SAFETY INFORMATION:





In the interest of safety, wellness, and injury prevention; we encourage you to 

wear your 


sealbelt, if you smoke; quite smoking, and we encourage family to use a 

protective helmet 


for bicycling and other sporting events that present an increased risk for head 

injury.





IF YOUR SYMPTOMS WORSEN OR NEW SYMPTOMS DEVELOP, OR YOU HAVE CONCERNS ABOUT YOUR

 CONDITION; 


OR IF YOUR CONDITION WORSENS WHILE YOU ARE WAITING FOR YOUR FOLLOW UP 

APPOINTMENT; EITHER 


CONTACT YOUR PRIMARY CARE DOCTOR, THE PHYSICIAN WHOSE NAME AND NUMBER YOU WERE 

GIVEN, OR 


RETURN TO THE ED IMMEDIATELY.


Scripts


Cyclobenzaprine Hcl (CYCLOBENZAPRINE HCL) 10 Mg Tablet


1 TAB PO TID, #21 TAB


   Prov: LILI CONROY MD         1/17/21











LILI CONROY MD              Jan 17, 2021 09:21

## 2021-01-17 NOTE — RAD
EXAMINATION: XR CHEST 1V



CLINICAL HISTORY: Chest pain



EXAM DATE/TIME: 1/17/2021 9:18 AM



COMPARISON: 10/10/2016





FINDINGS: 



Lines, Tubes, and Devices: None.



Cardiomediastinal Silhouette: Normal heart size. Aortic atherosclerotic calcification.



Lungs and Pleura: Pulmonary hypoexpansion with mild diffuse interstitial prominence. Mild curvilinear
 subsegmental atelectasis and/or scarring in the right midlung zone and left lower lung zone. No defi
nite focal airspace consolidation or pleural effusion.



Bones and Soft Tissues: Median sternotomy wires and paramediastinal surgical clips.

 



IMPRESSION:



No definitive evidence of acute cardiopulmonary abnormality.



Nonspecific mild diffuse interstitial prominence, possibly chronic interstitial changes.



Electronically signed by: Jose David Hayden DO (1/17/2021 10:02 AM) [id number]

## 2021-01-21 ENCOUNTER — HOSPITAL ENCOUNTER (INPATIENT)
Dept: HOSPITAL 61 - ER | Age: 76
LOS: 3 days | Discharge: HOME | DRG: 417 | End: 2021-01-24
Attending: INTERNAL MEDICINE | Admitting: INTERNAL MEDICINE
Payer: COMMERCIAL

## 2021-01-21 VITALS — HEIGHT: 67 IN | BODY MASS INDEX: 28.55 KG/M2 | WEIGHT: 181.88 LBS

## 2021-01-21 VITALS — SYSTOLIC BLOOD PRESSURE: 152 MMHG | DIASTOLIC BLOOD PRESSURE: 63 MMHG

## 2021-01-21 VITALS — DIASTOLIC BLOOD PRESSURE: 66 MMHG | SYSTOLIC BLOOD PRESSURE: 151 MMHG

## 2021-01-21 VITALS — SYSTOLIC BLOOD PRESSURE: 148 MMHG | DIASTOLIC BLOOD PRESSURE: 67 MMHG

## 2021-01-21 DIAGNOSIS — Z82.49: ICD-10-CM

## 2021-01-21 DIAGNOSIS — M19.90: ICD-10-CM

## 2021-01-21 DIAGNOSIS — K21.9: ICD-10-CM

## 2021-01-21 DIAGNOSIS — I12.9: ICD-10-CM

## 2021-01-21 DIAGNOSIS — N17.0: ICD-10-CM

## 2021-01-21 DIAGNOSIS — E11.65: ICD-10-CM

## 2021-01-21 DIAGNOSIS — E78.00: ICD-10-CM

## 2021-01-21 DIAGNOSIS — E87.6: ICD-10-CM

## 2021-01-21 DIAGNOSIS — J18.9: ICD-10-CM

## 2021-01-21 DIAGNOSIS — D64.9: ICD-10-CM

## 2021-01-21 DIAGNOSIS — I24.8: ICD-10-CM

## 2021-01-21 DIAGNOSIS — Z95.1: ICD-10-CM

## 2021-01-21 DIAGNOSIS — E78.5: ICD-10-CM

## 2021-01-21 DIAGNOSIS — K81.0: Primary | ICD-10-CM

## 2021-01-21 DIAGNOSIS — N18.2: ICD-10-CM

## 2021-01-21 DIAGNOSIS — R74.01: ICD-10-CM

## 2021-01-21 DIAGNOSIS — K82.8: ICD-10-CM

## 2021-01-21 DIAGNOSIS — Z20.822: ICD-10-CM

## 2021-01-21 DIAGNOSIS — E11.22: ICD-10-CM

## 2021-01-21 DIAGNOSIS — I25.10: ICD-10-CM

## 2021-01-21 DIAGNOSIS — D63.8: ICD-10-CM

## 2021-01-21 DIAGNOSIS — R09.02: ICD-10-CM

## 2021-01-21 DIAGNOSIS — N40.0: ICD-10-CM

## 2021-01-21 DIAGNOSIS — E78.1: ICD-10-CM

## 2021-01-21 LAB
ALBUMIN SERPL-MCNC: 2.5 G/DL (ref 3.4–5)
ALBUMIN/GLOB SERPL: 0.5 {RATIO} (ref 1–1.7)
ALP SERPL-CCNC: 221 U/L (ref 46–116)
ALT SERPL-CCNC: 154 U/L (ref 16–63)
AMORPH SED URNS QL MICRO: PRESENT /HPF
ANION GAP SERPL CALC-SCNC: 11 MMOL/L (ref 6–14)
ANION GAP SERPL CALC-SCNC: 13 MMOL/L (ref 6–14)
APTT PPP: (no result) S
AST SERPL-CCNC: 100 U/L (ref 15–37)
BACTERIA #/AREA URNS HPF: 0 /HPF
BASOPHILS # BLD AUTO: 0 X10^3/UL (ref 0–0.2)
BASOPHILS NFR BLD: 0 % (ref 0–3)
BILIRUB SERPL-MCNC: 1.2 MG/DL (ref 0.2–1)
BILIRUB UR QL STRIP: (no result)
BUN SERPL-MCNC: 20 MG/DL (ref 8–26)
BUN SERPL-MCNC: 23 MG/DL (ref 8–26)
BUN/CREAT SERPL: 13 (ref 6–20)
CALCIUM SERPL-MCNC: 7.8 MG/DL (ref 8.5–10.1)
CALCIUM SERPL-MCNC: 8.6 MG/DL (ref 8.5–10.1)
CHLORIDE SERPL-SCNC: 101 MMOL/L (ref 98–107)
CHLORIDE SERPL-SCNC: 97 MMOL/L (ref 98–107)
CO2 SERPL-SCNC: 25 MMOL/L (ref 21–32)
CO2 SERPL-SCNC: 26 MMOL/L (ref 21–32)
CREAT SERPL-MCNC: 1.6 MG/DL (ref 0.7–1.3)
CREAT SERPL-MCNC: 1.8 MG/DL (ref 0.7–1.3)
EOSINOPHIL NFR BLD: 0.1 X10^3/UL (ref 0–0.7)
EOSINOPHIL NFR BLD: 1 % (ref 0–3)
ERYTHROCYTE [DISTWIDTH] IN BLOOD BY AUTOMATED COUNT: 14.1 % (ref 11.5–14.5)
FIBRINOGEN PPP-MCNC: CLEAR MG/DL
GFR SERPLBLD BASED ON 1.73 SQ M-ARVRAT: 36.8 ML/MIN
GFR SERPLBLD BASED ON 1.73 SQ M-ARVRAT: 42.1 ML/MIN
GLUCOSE SERPL-MCNC: 191 MG/DL (ref 70–99)
GLUCOSE SERPL-MCNC: 201 MG/DL (ref 70–99)
GRAN CASTS #/AREA URNS LPF: (no result) /HPF
HCT VFR BLD CALC: 36.4 % (ref 39–53)
HGB BLD-MCNC: 12.4 G/DL (ref 13–17.5)
HYALINE CASTS #/AREA URNS LPF: (no result) /HPF
LIPASE: 69 U/L (ref 73–393)
LYMPHOCYTES # BLD: 1.1 X10^3/UL (ref 1–4.8)
LYMPHOCYTES NFR BLD AUTO: 10 % (ref 24–48)
MCH RBC QN AUTO: 31 PG (ref 25–35)
MCHC RBC AUTO-ENTMCNC: 34 G/DL (ref 31–37)
MCV RBC AUTO: 91 FL (ref 79–100)
MONO #: 1.1 X10^3/UL (ref 0–1.1)
MONOCYTES NFR BLD: 10 % (ref 0–9)
NEUT #: 8.5 X10^3/UL (ref 1.8–7.7)
NEUTROPHILS NFR BLD AUTO: 79 % (ref 31–73)
NITRITE UR QL STRIP: NEGATIVE
PH UR STRIP: 5.5 [PH]
PLATELET # BLD AUTO: 228 X10^3/UL (ref 140–400)
POTASSIUM SERPL-SCNC: 2.9 MMOL/L (ref 3.5–5.1)
POTASSIUM SERPL-SCNC: 3.2 MMOL/L (ref 3.5–5.1)
PROT SERPL-MCNC: 7.3 G/DL (ref 6.4–8.2)
PROT UR STRIP-MCNC: >=300 MG/DL
PROTHROMBIN TIME: 14.4 SEC (ref 11.7–14)
RBC # BLD AUTO: 4.01 X10^6/UL (ref 4.3–5.7)
RBC #/AREA URNS HPF: 0 /HPF (ref 0–2)
SODIUM SERPL-SCNC: 134 MMOL/L (ref 136–145)
SODIUM SERPL-SCNC: 139 MMOL/L (ref 136–145)
UROBILINOGEN UR-MCNC: 1 MG/DL
WBC # BLD AUTO: 10.8 X10^3/UL (ref 4–11)
WBC #/AREA URNS HPF: (no result) /HPF (ref 0–4)

## 2021-01-21 PROCEDURE — 80053 COMPREHEN METABOLIC PANEL: CPT

## 2021-01-21 PROCEDURE — 85610 PROTHROMBIN TIME: CPT

## 2021-01-21 PROCEDURE — 76705 ECHO EXAM OF ABDOMEN: CPT

## 2021-01-21 PROCEDURE — 84484 ASSAY OF TROPONIN QUANT: CPT

## 2021-01-21 PROCEDURE — U0003 INFECTIOUS AGENT DETECTION BY NUCLEIC ACID (DNA OR RNA); SEVERE ACUTE RESPIRATORY SYNDROME CORONAVIRUS 2 (SARS-COV-2) (CORONAVIRUS DISEASE [COVID-19]), AMPLIFIED PROBE TECHNIQUE, MAKING USE OF HIGH THROUGHPUT TECHNOLOGIES AS DESCRIBED BY CMS-2020-01-R: HCPCS

## 2021-01-21 PROCEDURE — 80061 LIPID PANEL: CPT

## 2021-01-21 PROCEDURE — 93005 ELECTROCARDIOGRAM TRACING: CPT

## 2021-01-21 PROCEDURE — 96365 THER/PROPH/DIAG IV INF INIT: CPT

## 2021-01-21 PROCEDURE — 81001 URINALYSIS AUTO W/SCOPE: CPT

## 2021-01-21 PROCEDURE — 80048 BASIC METABOLIC PNL TOTAL CA: CPT

## 2021-01-21 PROCEDURE — 83880 ASSAY OF NATRIURETIC PEPTIDE: CPT

## 2021-01-21 PROCEDURE — 71045 X-RAY EXAM CHEST 1 VIEW: CPT

## 2021-01-21 PROCEDURE — 96361 HYDRATE IV INFUSION ADD-ON: CPT

## 2021-01-21 PROCEDURE — 82962 GLUCOSE BLOOD TEST: CPT

## 2021-01-21 PROCEDURE — 83690 ASSAY OF LIPASE: CPT

## 2021-01-21 PROCEDURE — 87426 SARSCOV CORONAVIRUS AG IA: CPT

## 2021-01-21 PROCEDURE — 36415 COLL VENOUS BLD VENIPUNCTURE: CPT

## 2021-01-21 PROCEDURE — 83735 ASSAY OF MAGNESIUM: CPT

## 2021-01-21 PROCEDURE — G0378 HOSPITAL OBSERVATION PER HR: HCPCS

## 2021-01-21 PROCEDURE — 85025 COMPLETE CBC W/AUTO DIFF WBC: CPT

## 2021-01-21 RX ADMIN — INSULIN LISPRO SCH UNITS: 100 INJECTION, SOLUTION INTRAVENOUS; SUBCUTANEOUS at 18:57

## 2021-01-21 RX ADMIN — PIPERACILLIN SODIUM AND TAZOBACTAM SODIUM SCH MLS/HR: 2; .25 INJECTION, POWDER, LYOPHILIZED, FOR SOLUTION INTRAVENOUS at 23:48

## 2021-01-21 RX ADMIN — METOPROLOL TARTRATE SCH MG: 5 INJECTION INTRAVENOUS at 23:49

## 2021-01-21 RX ADMIN — METOPROLOL TARTRATE SCH MG: 5 INJECTION INTRAVENOUS at 18:33

## 2021-01-21 NOTE — PDOC2
CARDIAC CONSULT


DATE OF CONSULT


Date of Consult


DATE: 1/21/21 


TIME: 15:55





REASON FOR CONSULT


Reason for Consult:


Chest pain, r/o ACS





REFERRING PHYSICIAN


Referring Physician:


Laurie





SOURCE


Source:  Chart review, Patient





HISTORY OF PRESENT ILLNESS


HISTORY OF PRESENT ILLNESS


This is a 74 yo male admitted for complains of abdominal pain. This is mainly to

right side and epigastric region. midchest pain occurred after the abdominal 

pain. This started about 5 days ago. Also some loss of appetite with some nausea

but no vomiting. No diarrhea, palpitations, SOA, No fever or chills, no loss of 

taste or smell. Denies routine NSAIDs. Denies any falls or any injury. 

Verbalized med compliance. Denies drinking ETOH and no further smoking tobacco 

since 2016 after his CABG. Still has some epigastric tenderness with palpation. 

Denies any recent Covid-19 exposure. Denies any exertional CP nor ZHOU.





PAST MEDICAL HISTORY


Past Medical History


Cardiovascular:  HTN, Hyperlipidemia, CAD, carotid artery disease


Pulmonary:  No pertinent hx


CENTRAL NERVOUS SYSTEM:  Other (No pertinent history)


GI:  GERD


Heme/Onc:  Anemia


Hepatobiliary:  No pertinent hx


Psych:  No pertinent hx


Musculoskeletal:  Osteoarthritis


Rheumatologic:  No pertinent hx


Infectious disease:  No pertinent hx


ENT:  No pertinent hx


Renal/:  BPH, CKD


Endocrine:  Diabetes (2)


Dermatology:  No pertinent hx





PAST SURGICAL HISTORY


Past Surgical History


CABG x4 LIMA to LAD, SVG to OM1 and OM2, SVG to distal RCA 01/29/2016, Cataract 

Removal





FAMILY HISTORY


Family History:  Coronary Artery Disease (brother)





SOCIAL HISTORY


Smoke:  Quit


ALCOHOL:  none


Drugs:  None


Lives:  with Family





CURRENT MEDICATIONS


CURRENT MEDICATIONS





Current Medications








 Medications


  (Trade)  Dose


 Ordered  Sig/Jillian


 Route


 PRN Reason  Start Time


 Stop Time Status Last Admin


Dose Admin


 


 Multi-Ingredient


 Mouthwash/Gargle


  (Gi Cocktail)  20 ml  1X  ONCE


 SWSW


   1/21/21 10:30


 1/21/21 10:31 DC 1/21/21 10:49





 


 Potassium


 Chloride/Water  100 ml @ 


 50 mls/hr  1X  ONCE


 IV


   1/21/21 12:45


 1/21/21 14:44 DC 1/21/21 13:08





 


 Ceftriaxone Sodium


  (Rocephin)  1 gm  1X  ONCE


 IVP


   1/21/21 14:00


 1/21/21 14:07 DC 1/21/21 15:12





 


 Azithromycin  250 ml @ 


 250 mls/hr  1X  ONCE


 IV


   1/21/21 14:00


 1/21/21 14:59 DC 1/21/21 15:17





 


 Sodium Chloride  1,000 ml @ 


 75 mls/hr  [id number]


 IV


   1/21/21 14:15


 1/22/21 14:14  1/21/21 14:15














ALLERGIES


ALLERGIES:  


Coded Allergies:  


     No Known Drug Allergies (Unverified , 5/24/17)





ROS


Review of System


14 point ROS evaluated with pertinent positives noted per HPI





PHYSICAL EXAM


General:  Alert, Oriented X3, Cooperative, No acute distress


HEENT:  Atraumatic, Mucous membr. moist/pink


Lungs:  Clear to auscultation, Normal air movement


Heart:  Regular rate (SR no ectopies), Normal S1, Normal S2, No murmurs


Abdomen:  Soft, Other (epigastric tenderness)


Skin:  No breakdown, No significant lesion


Neuro:  Normal speech, Sensation intact


Psych/Mental Status:  Mood NL


MUSCULOSKELETAL:  Osteoarthritic changes both hands





VITALS/I&O


VITALS/I&O:





                                   Vital Signs








  Date Time  Temp Pulse Resp B/P (MAP) Pulse Ox O2 Delivery O2 Flow Rate FiO2


 


1/21/21 14:21  68  160/70 (100) 96 Room Air  


 


1/21/21 10:10 98.0  22     





 98.0       











LABS


Lab:





                                Laboratory Tests








Test


 1/21/21


10:50 1/21/21


10:54 1/21/21


11:40 1/21/21


14:00


 


Urine Collection Type Unknown     


 


Urine Color Juanita     


 


Urine Clarity Clear     


 


Urine pH


 5.5 (<5.0-8.0)


 


 


 





 


Urine Specific Gravity


 1.025


(1.000-1.030) 


 


 





 


Urine Protein


 >=300 mg/dL


(NEG-TRACE) 


 


 





 


Urine Glucose (UA)


 100 mg/dL


(NEG) 


 


 





 


Urine Ketones (Stick)


 Negative mg/dL


(NEG) 


 


 





 


Urine Blood Trace (NEG)     


 


Urine Nitrite


 Negative (NEG)


 


 


 





 


Urine Bilirubin Small (NEG)     


 


Urine Urobilinogen Dipstick


 1.0 mg/dL (0.2


mg/dL) 


 


 





 


Urine Leukocyte Esterase


 Negative (NEG)


 


 


 





 


Urine RBC 0 /HPF (0-2)     


 


Urine WBC


 1-4 /HPF (0-4)


 


 


 





 


Urine Squamous Epithelial


Cells Few /LPF  


 


 


 





 


Urine Amorphous Sediment Present /HPF     


 


Urine Bacteria


 0 /HPF (0-FEW)


 


 


 





 


Urine Hyaline Casts Few /HPF     


 


Urine Granular Casts Few /HPF     


 


Glucose (Fingerstick)


 


 193 mg/dL


(70-99)  H 


 





 


White Blood Count


 


 


 10.8 x10^3/uL


(4.0-11.0) 





 


Red Blood Count


 


 


 4.01 x10^6/uL


(4.30-5.70)  L 





 


Hemoglobin


 


 


 12.4 g/dL


(13.0-17.5)  L 





 


Hematocrit


 


 


 36.4 %


(39.0-53.0)  L 





 


Mean Corpuscular Volume


 


 


 91 fL ()


 





 


Mean Corpuscular Hemoglobin   31 pg (25-35)   


 


Mean Corpuscular Hemoglobin


Concent 


 


 34 g/dL


(31-37) 





 


Red Cell Distribution Width


 


 


 14.1 %


(11.5-14.5) 





 


Platelet Count


 


 


 228 x10^3/uL


(140-400) 





 


Neutrophils (%) (Auto)   79 % (31-73)  H 


 


Lymphocytes (%) (Auto)   10 % (24-48)  L 


 


Monocytes (%) (Auto)   10 % (0-9)  H 


 


Eosinophils (%) (Auto)   1 % (0-3)   


 


Basophils (%) (Auto)   0 % (0-3)   


 


Neutrophils # (Auto)


 


 


 8.5 x10^3/uL


(1.8-7.7)  H 





 


Lymphocytes # (Auto)


 


 


 1.1 x10^3/uL


(1.0-4.8) 





 


Monocytes # (Auto)


 


 


 1.1 x10^3/uL


(0.0-1.1) 





 


Eosinophils # (Auto)


 


 


 0.1 x10^3/uL


(0.0-0.7) 





 


Basophils # (Auto)


 


 


 0.0 x10^3/uL


(0.0-0.2) 





 


Sodium Level


 


 


 134 mmol/L


(136-145)  L 





 


Potassium Level


 


 


 2.9 mmol/L


(3.5-5.1)  *L 





 


Chloride Level


 


 


 97 mmol/L


()  L 





 


Carbon Dioxide Level


 


 


 26 mmol/L


(21-32) 





 


Anion Gap   11 (6-14)   


 


Blood Urea Nitrogen


 


 


 23 mg/dL


(8-26) 





 


Creatinine


 


 


 1.8 mg/dL


(0.7-1.3)  H 





 


Estimated GFR


(Cockcroft-Gault) 


 


 36.8  


 





 


BUN/Creatinine Ratio   13 (6-20)   


 


Glucose Level


 


 


 191 mg/dL


(70-99)  H 





 


Calcium Level


 


 


 8.6 mg/dL


(8.5-10.1) 





 


Magnesium Level


 


 


 1.9 mg/dL


(1.8-2.4) 





 


Total Bilirubin


 


 


 1.2 mg/dL


(0.2-1.0)  H 





 


Aspartate Amino Transferase


(AST) 


 


 100 U/L


(15-37)  H 





 


Alanine Aminotransferase (ALT)


 


 


 154 U/L


(16-63)  H 





 


Alkaline Phosphatase


 


 


 221 U/L


()  H 





 


Troponin I Quantitative


 


 


 0.022 ng/mL


(0.000-0.055) 0.022 ng/mL


(0.000-0.055)


 


NT-Pro-B-Type Natriuretic


Peptide 


 


 998 pg/mL


(0-449)  H 





 


Total Protein


 


 


 7.3 g/dL


(6.4-8.2) 





 


Albumin


 


 


 2.5 g/dL


(3.4-5.0)  L 





 


Albumin/Globulin Ratio


 


 


 0.5 (1.0-1.7)


L 





 


Lipase


 


 


 69 U/L


()  L 








                                Laboratory Tests


1/21/21 11:40








                                Laboratory Tests


1/21/21 11:40











ECHOCARDIOGRAM


ECHOCARDIOGRAM





<Conclusion>


The left ventricular systolic function is normal.


The Ejection Fraction is 55-60%.


There is normal LV segmental wall motion.


Transmitral Doppler flow pattern is Grade I-abnormal relaxation pattern.


Trace mitral regurgitation.


Trace tricuspid regurgitation with an estimated PAP of 33 mmHg.


There is no evidence of significant pericardial effusion.





DATE:     11/19/20





STRESS TEST


STRESS TEST





Conclusion


1. Regadenoson cardioisotope stress test showed moderate sized inferolateral 

wall infarct without any ischemia.


2. Abnormal septal motion most probably secondary to postoperative state.  The 

ejection fraction is calculated at 48%.


3. Low risk for cardiac events.





DATE:     11/19/20 [id number] 0





HEART CATH


HEART CATH





Hemodynamics.


Left ventricle pressure 128/12, aortic root pressure 126/64.


Coronaries.


Left main.  The left main had a proximal 40-50% lesion which caused dampening 

with 6 [place] diagnostic catheters.


Left anterior descending.  The LAD had a proximal 60% and a mid subtotal lesion.

 There was extensive collateralization throughout the left system.


Left circumflex.  The left circumflex had a mid 70% lesion.


Right coronary artery.  Right coronary had a mid 50% lesion in the distal 

subtotal to total occlusion.  There was extensive collateralization from the 

left system.


Left ventriculogram.


The left ventricle showed inferior to the inferior apical hypokinesis.  Ejection

fraction was estimated at 40%.  There was mild to moderate mitral regurgitation.








<Conclusion>


3 vessel coronary artery disease with a left main lesion in a diabetic patient.


Decreased LV systolic function with an ejection fraction of 40%.


Mild to moderate mitral regurgitation.





DATE:     01/27/16 0957





ASSESSMENT/PLAN


ASSESSMENT/PLAN


1. Atypical chest pain:  more from abd pain.  likely from acute cholecystitis


2. CAD; CABGx4 in 2014, clinically stable. Trops nml EKG SR without acute 

changes


3. JOSSE on CKD2: baseline Cr at 1.2-1.5. Cr at 1.8


4. HTN: controlled


5. HLP with hypertriglyceridemia


6. DM2


7. Transaminitis


8. Hypokalemia


9. PUI





Recommendations


1. IV lopressor for now while NPO


2. Obtain post void residual and note need for saha. 


3. IVF while NPO. No NSAIDs  Hold home ACEi. 


4. Await general surgery input. Antibiotics ongoing. Replace K


5. Recent stress test and TTE as noted above. He is low to moderate risk for 

perioperative CV events for noncardiac surgery. 


6. Lipids in AM and BMP











DHRUV MAS APRN          Jan 21, 2021 16:32

## 2021-01-21 NOTE — RAD
EXAM: Chest, single view.



HISTORY: Chest pain.



COMPARISON: CT angiogram dated 1/17/2021.



FINDINGS: A frontal view of the chest is obtained. There is suspected focal right upper lobe intersti
tial infiltrate or scarring. There is no consolidation. There is no pleural effusion or pneumothorax.
 There is a stable prominent cardiac silhouette and evidence of prior CABG. There are stable chronic 
deformity of the left acromioclavicular joint.



IMPRESSION: Suspected focal right upper lobe interstitial infiltrate or scarring.



Electronically signed by: Ophelia Britt MD (1/21/2021 10:57 AM) [id number]

## 2021-01-21 NOTE — RAD
INDICATION : Reason: epigastric pain elevated LFTs / Spl. Instructions:  / History: 



COMPARISON: May 2016



TECHNIQUE: Multiple ultrasound images obtained through the abdomen in grayscale and color.



FINDINGS:



Liver: Echogenic

Gallbladder: Internal echoes are seen within the gallbladder which could be from sludge. Gallbladder 
wall measures up to about 6 mm with pericholecystic edema.

IVC: Partially distended at level of liver.

Common Bile Duct: Not dilated.

Pancreas: Not well seen secondary to overlying structures obscuring.

Right Kidney:  No hydronephrosis.



IMPRESSION:



*   Debris within the gallbladder with gallbladder wall thickening and pericholecystic edema. This wa
ll thickening is a nonspecific finding and can be from primary gallbladder inflammation from cholecys
titis, reactive to adjacent hepatic inflammation or a systemic process such as hypoproteinemia. If fu
rther evaluation of the gallbladder is desired nuclear perihilar scan could BE obtained. The patient 
also had gallbladder wall thickening on prior exam from 2016.



*  Liver is echogenic. Nonspecific but can be seen with fatty infiltration.



Electronically signed by: Toño Jaramillo MD (1/21/2021 1:32 PM) [id number]

## 2021-01-21 NOTE — PDOC1
History and Physical


Date of Admission


Date of Admission


DATE: 1/21/21 


TIME: 14:19





Identification/Chief Complaint


Chief Complaint


Abdominal pain





Source


Source:  Caregiver, Chart review, Patient





History of Present Illness


History of Present Illness


Mr Dhaliwal is a 75  year old male with history of hypertension, diabetes, 

coronary disease status post CABG x4 who presents to the ED with his daughter 

with epigastric pain and chest pain as well as right flank pain.


Pain is epigastric and substernal, sharp, intermittent, 7/10 with occasional 

radiation to right flank, though it is not currently radiating. Food does not 

affect his pain. No recent sick contacts. He does have some shortness of breath 

that is intermittent. No cough. Occasional chills.


He was seen in the emergency department on Saturday, January 16 for flank pain. 

Nothing makes it better or worse.  He reports that he had a decrease in 

appetite.  He said some nausea without vomiting.  Patient denies diarrhea, no 

fevers, no syncope headache neck pain.  He has had some intermittent chills. 

Patient is not a smoker.  Denies daily drugs or alcohol.  Patient is accompanied

by family member who helps with history.





EKG appears normal sinus rhythm with ventricular rate of 74 bpm.  ND interval 

194 ms.  QRS duration 96 ms.  QTc 460 ms.  PAC noted.  No acute ST segment 

elevations.





Chest radiograph with suspected focal right upper lobe interstitial infiltrate 

or scarring.


RUQ US with debris within the gallbladder with gallbladder wall thickening and 

pericholecystic edema. Echogenic liver.


Labs significant for WBC 10.8 with left shift, Hb 12.4, platelets 228, , 

K2.9, BUN 23, CR 1.8, glucose 191, albumin 2.5, alk phos 221, , , 

bilirubin 1.2, lipase 69, , troponin 0 0.022 x 2


Admitted for further care.





Past Medical History


Cardiovascular:  CAD, HTN, Hyperlipidemia


Pulmonary:  No pertinent hx


CENTRAL NERVOUS SYSTEM:  Other


GI:  No pertinent hx


Heme/Onc:  No pertinent hx


Hepatobiliary:  No pertinent hx


Psych:  No pertinent hx


Musculoskeletal:  Osteoarthritis


Rheumatologic:  No pertinent hx


Infectious disease:  No pertinent hx


Renal/:  No pertinent hx


Endocrine:  Diabetes





Past Surgical History


Past Surgical History:  CABG, Cataract Removal, Hernia Repair





Family History


Family History:  Coronary Artery Disease





Social History


Smoke:  No


ALCOHOL:  occassional


Drugs:  None





Current Problem List


Problem List


Problems


Medical Problems:


(1) Cholecystitis


Status: Acute  











Current Medications


Current Medications





Current Medications


Multi-Ingredient Mouthwash/Gargle (Gi Cocktail) 20 ml 1X  ONCE SWSW  Last 

administered on 1/21/21at 10:49;  Start 1/21/21 at 10:30;  Stop 1/21/21 at 

10:31;  Status DC


Potassium Chloride/Water 100 ml @  50 mls/hr 1X  ONCE IV  Last administered on 

1/21/21at 13:08;  Start 1/21/21 at 12:45;  Stop 1/21/21 at 14:44


Ceftriaxone Sodium (Rocephin) 1 gm 1X  ONCE IVP ;  Start 1/21/21 at 14:00;  Stop

1/21/21 at 14:07;  Status DC


Azithromycin 250 ml @  250 mls/hr 1X  ONCE IV ;  Start 1/21/21 at 14:00;  Stop 

1/21/21 at 14:59


Ondansetron HCl (Zofran) 4 mg PRN Q8HRS  PRN IV NAUSEA/VOMITING;  Start 1/21/21 

at 14:15;  Stop 1/22/21 at 14:14


Morphine Sulfate (Morphine Sulfate) 4 mg PRN Q2HR  PRN IV PAIN;  Start 1/21/21 

at 14:15;  Stop 1/22/21 at 14:14


Sodium Chloride 1,000 ml @  75 mls/hr [id number] IV ;  Start 1/21/21 at 14:15;  

Stop 1/22/21 at 14:14





Active Scripts


Active


Cyclobenzaprine Hcl 10 Mg Tablet 1 Tab PO TID


Norco 5-325 Tablet (Acetaminophen/Hydrocodone Bitart) 1 Each Tablet 1 Tab PO PRN

Q6HRS PRN


Novolog Flexpen (Insulin Aspart) 100 Unit/1 Ml Insuln.pen 18 Unit SQ BEFORE 

DINNER


Norco 5-325 Tablet (Acetaminophen/Hydrocodone Bitart) 1 Each Tablet 1 Tab PO BID


Pantoprazole Sodium  ** (Pantoprazole Sodium) 40 Mg Tablet.dr 40 Mg PO DAILYAC


Metoprolol Tartrate 25 Mg Tablet 25 Mg PO BID


Lisinopril 5 Mg Tablet 5 Mg PO DAILY


Levemir Flextouch (Insulin Detemir) 100 Unit/1 Ml Insuln.pen 50 Units SQ QHS


Reported


Aspirin Buffered 325 Mg Tab (Aspirin/Calcium Carbonate/Mag) 325 Mg Tablet 325 Mg

PO DAILY


Metformin Hcl 1,000 Mg Tablet 1,000 Mg PO [id number]


Simvastatin 40 Mg Tablet 40 Mg PO DAILY





Allergies


Allergies:  


Coded Allergies:  


     No Known Drug Allergies (Unverified , 5/24/17)





ROS


General:  YES: Fatigue, Malaise, Appetite; 


   No: Chills, Night Sweats, Other


PSYCHOLOGICAL ROS:  No: Anxiety, Behavioral Disorder, Concentration difficultie,

Decreased libido, Depression, Disorientation, Hallucinations, Hostility, 

Irritablity, Memory difficulties, Mood Swings, Obsessive thoughts, Physical 

abuse, Sexual abuse, Sleep disturbances, Suicidal ideation, Other


Eyes:  No Blurry vision, No Decreased vision, No Double vision, No Dry eyes, No 

Excessive tearing, No Eye Pain, No Itchy Eyes, No Loss of vision, No 

Photophobia, No Scotomata, No Uses contacts, No Uses glasses, No Other


HEENT:  No: Heacaches, Visual Changes, Hearing change, Nasal congestion, Nasal 

discharge, Oral lesions, Sinus pain, Sore Throat, Epistaxis, Sneezing, Snoring, 

Tinnitus, Vertigo, Vocal changes, Other


ALLERGY AND IMMUNOLOGY:  No: Hives, Insect Bite Sensitivity, Itchy/Watery Eyes, 

Nasal Congestion, Post Nasal Drip, Seasonal Allergies, Other


Hematological and Lymphatic:  No: Bleeding Problems, Blood Clots, Blood 

Transfusions, Brusing, Night Sweats, Pallor, Swollen Lymph Nodes, Other


ENDOCRINE:  No: Breast Changes, Galactorrhea, Hair Pattern Changes, Hot Flashes,

Malaise/lethargy, Mood Swings, Palpitations, Polydipsia/polyuria, Skin Changes, 

Temperature Intolerance, Unexpected Weight Changes, Other


Breast:  No New/Changing Breast Lumps, No Nipple changes, No Nipple discharge, N

o Other


Respiratory:  YES: Shortness of breath; 


   No: Cough, Hemoptysis, Orthopnea, Pleuritic Pain, SOB with excertion, Sputum 

Changes, Stridor, Tachypnea, Wheezing, Other


Cardiovascular:  yes Chest Pain; 


   No Palpitations, No Orthopnea, No Paroxysmal Noc. Dyspnea, No Edema, No Lt 

Headedness, No Other


Gastrointestinal:  Yes Nausea, Yes Abdominal Pain; 


   No Vomiting, No Diarrhea, No Constipation, No Melena, No Hematochezia, No 

Other


Genitourinary:  No Dysuria, No Frequency, No Incontinence, No Hematuria, No 

Retention, No Discharge, No Urgency, No Pain, No Flank Pain, No Other, No , No ,

No , No , No , No , No 


Musculoskeletal:  No Gait Disturbance, No Joint Pain, No Joint Stiffness, No 

Joint Swelling, No Muscle Pain, No Muscular Weakness, No Pain In:, No Swelling 

In:, No Other


Neurological:  No Behavorial Changes, No Bowel/Bladder ControlChng, No 

Confusion, No Dizziness, No Gait Disturbance, No Headaches, No Impaired 

Coord/balance, No Memory Loss, No Numbness/Tingling, No Seizures, No Speech 

Problems, No Tremors, No Visual Changes, No Weakness, No Other


Skin:  No Dry Skin, No Eczema, No Hair Changes, No Lumps, No Mole Changes, No 

Mottling, No Nail Changes, No Pruritus, No Rash, No Skin Lesion Changes, No 

Other, No Acne





Physical Exam


General:  Alert, Oriented X3, Cooperative, mild distress


HEENT:  Atraumatic, PERRLA, EOMI, Mucous membr. moist/pink


Lungs:  Clear to auscultation, Normal air movement


Heart:  S1S2, RRR, no thrills, no rubs, no gallops, no murmurs


Abdomen:  Normal bowel sounds, Soft, No hepatosplenomegaly, No masses, Other 

(epigastric and RUQ tenderness)


Rectal Exam:  not examined


Extremities:  No clubbing, No cyanosis, No edema, Normal pulses, No 

tenderness/swelling


Skin:  No rashes, No breakdown, No significant lesion


Neuro:  Normal gait, Normal speech, Strength at 5/5 X4 ext, Normal tone, 

Sensation intact, Cranial nerves 3-12 NL, Reflexes 2+


Psych/Mental Status:  Mental status NL, Mood NL





Vitals


Vitals





Vital Signs








  Date Time  Temp Pulse Resp B/P (MAP) Pulse Ox O2 Delivery O2 Flow Rate FiO2


 


1/21/21 10:10 98.0 80 22 146/67 (93) 98 Room Air  





 98.0       











Labs


Labs





Laboratory Tests








Test


 1/21/21


10:50 1/21/21


10:54 1/21/21


11:40


 


Urine Collection Type Unknown   


 


Urine Color Juanita   


 


Urine Clarity Clear   


 


Urine pH 5.5 (<5.0-8.0)   


 


Urine Specific Gravity


 1.025


(1.000-1.030) 


 





 


Urine Protein


 >=300 mg/dL


(NEG-TRACE) 


 





 


Urine Glucose (UA)


 100 mg/dL


(NEG) 


 





 


Urine Ketones (Stick)


 Negative mg/dL


(NEG) 


 





 


Urine Blood Trace (NEG)   


 


Urine Nitrite Negative (NEG)   


 


Urine Bilirubin Small (NEG)   


 


Urine Urobilinogen Dipstick


 1.0 mg/dL (0.2


mg/dL) 


 





 


Urine Leukocyte Esterase Negative (NEG)   


 


Urine RBC 0 /HPF (0-2)   


 


Urine WBC 1-4 /HPF (0-4)   


 


Urine Squamous Epithelial


Cells Few /LPF 


 


 





 


Urine Amorphous Sediment Present /HPF   


 


Urine Bacteria 0 /HPF (0-FEW)   


 


Urine Hyaline Casts Few /HPF   


 


Urine Granular Casts Few /HPF   


 


Glucose (Fingerstick)


 


 193 mg/dL


(70-99) 





 


White Blood Count


 


 


 10.8 x10^3/uL


(4.0-11.0)


 


Red Blood Count


 


 


 4.01 x10^6/uL


(4.30-5.70)


 


Hemoglobin


 


 


 12.4 g/dL


(13.0-17.5)


 


Hematocrit


 


 


 36.4 %


(39.0-53.0)


 


Mean Corpuscular Volume   91 fL () 


 


Mean Corpuscular Hemoglobin   31 pg (25-35) 


 


Mean Corpuscular Hemoglobin


Concent 


 


 34 g/dL


(31-37)


 


Red Cell Distribution Width


 


 


 14.1 %


(11.5-14.5)


 


Platelet Count


 


 


 228 x10^3/uL


(140-400)


 


Neutrophils (%) (Auto)   79 % (31-73) 


 


Lymphocytes (%) (Auto)   10 % (24-48) 


 


Monocytes (%) (Auto)   10 % (0-9) 


 


Eosinophils (%) (Auto)   1 % (0-3) 


 


Basophils (%) (Auto)   0 % (0-3) 


 


Neutrophils # (Auto)


 


 


 8.5 x10^3/uL


(1.8-7.7)


 


Lymphocytes # (Auto)


 


 


 1.1 x10^3/uL


(1.0-4.8)


 


Monocytes # (Auto)


 


 


 1.1 x10^3/uL


(0.0-1.1)


 


Eosinophils # (Auto)


 


 


 0.1 x10^3/uL


(0.0-0.7)


 


Basophils # (Auto)


 


 


 0.0 x10^3/uL


(0.0-0.2)


 


Sodium Level


 


 


 134 mmol/L


(136-145)


 


Potassium Level


 


 


 2.9 mmol/L


(3.5-5.1)


 


Chloride Level


 


 


 97 mmol/L


()


 


Carbon Dioxide Level


 


 


 26 mmol/L


(21-32)


 


Anion Gap   11 (6-14) 


 


Blood Urea Nitrogen


 


 


 23 mg/dL


(8-26)


 


Creatinine


 


 


 1.8 mg/dL


(0.7-1.3)


 


Estimated GFR


(Cockcroft-Gault) 


 


 36.8 





 


BUN/Creatinine Ratio   13 (6-20) 


 


Glucose Level


 


 


 191 mg/dL


(70-99)


 


Calcium Level


 


 


 8.6 mg/dL


(8.5-10.1)


 


Magnesium Level


 


 


 1.9 mg/dL


(1.8-2.4)


 


Total Bilirubin


 


 


 1.2 mg/dL


(0.2-1.0)


 


Aspartate Amino Transf


(AST/SGOT) 


 


 100 U/L


(15-37)


 


Alanine Aminotransferase


(ALT/SGPT) 


 


 154 U/L


(16-63)


 


Alkaline Phosphatase


 


 


 221 U/L


()


 


Troponin I Quantitative


 


 


 0.022 ng/mL


(0.000-0.055)


 


NT-Pro-B-Type Natriuretic


Peptide 


 


 998 pg/mL


(0-449)


 


Total Protein


 


 


 7.3 g/dL


(6.4-8.2)


 


Albumin


 


 


 2.5 g/dL


(3.4-5.0)


 


Albumin/Globulin Ratio   0.5 (1.0-1.7) 


 


Lipase


 


 


 69 U/L


()








Laboratory Tests








Test


 1/21/21


10:50 1/21/21


10:54 1/21/21


11:40


 


Urine Collection Type Unknown   


 


Urine Color Juanita   


 


Urine Clarity Clear   


 


Urine pH 5.5 (<5.0-8.0)   


 


Urine Specific Gravity


 1.025


(1.000-1.030) 


 





 


Urine Protein


 >=300 mg/dL


(NEG-TRACE) 


 





 


Urine Glucose (UA)


 100 mg/dL


(NEG) 


 





 


Urine Ketones (Stick)


 Negative mg/dL


(NEG) 


 





 


Urine Blood Trace (NEG)   


 


Urine Nitrite Negative (NEG)   


 


Urine Bilirubin Small (NEG)   


 


Urine Urobilinogen Dipstick


 1.0 mg/dL (0.2


mg/dL) 


 





 


Urine Leukocyte Esterase Negative (NEG)   


 


Urine RBC 0 /HPF (0-2)   


 


Urine WBC 1-4 /HPF (0-4)   


 


Urine Squamous Epithelial


Cells Few /LPF 


 


 





 


Urine Amorphous Sediment Present /HPF   


 


Urine Bacteria 0 /HPF (0-FEW)   


 


Urine Hyaline Casts Few /HPF   


 


Urine Granular Casts Few /HPF   


 


Glucose (Fingerstick)


 


 193 mg/dL


(70-99) 





 


White Blood Count


 


 


 10.8 x10^3/uL


(4.0-11.0)


 


Red Blood Count


 


 


 4.01 x10^6/uL


(4.30-5.70)


 


Hemoglobin


 


 


 12.4 g/dL


(13.0-17.5)


 


Hematocrit


 


 


 36.4 %


(39.0-53.0)


 


Mean Corpuscular Volume   91 fL () 


 


Mean Corpuscular Hemoglobin   31 pg (25-35) 


 


Mean Corpuscular Hemoglobin


Concent 


 


 34 g/dL


(31-37)


 


Red Cell Distribution Width


 


 


 14.1 %


(11.5-14.5)


 


Platelet Count


 


 


 228 x10^3/uL


(140-400)


 


Neutrophils (%) (Auto)   79 % (31-73) 


 


Lymphocytes (%) (Auto)   10 % (24-48) 


 


Monocytes (%) (Auto)   10 % (0-9) 


 


Eosinophils (%) (Auto)   1 % (0-3) 


 


Basophils (%) (Auto)   0 % (0-3) 


 


Neutrophils # (Auto)


 


 


 8.5 x10^3/uL


(1.8-7.7)


 


Lymphocytes # (Auto)


 


 


 1.1 x10^3/uL


(1.0-4.8)


 


Monocytes # (Auto)


 


 


 1.1 x10^3/uL


(0.0-1.1)


 


Eosinophils # (Auto)


 


 


 0.1 x10^3/uL


(0.0-0.7)


 


Basophils # (Auto)


 


 


 0.0 x10^3/uL


(0.0-0.2)


 


Sodium Level


 


 


 134 mmol/L


(136-145)


 


Potassium Level


 


 


 2.9 mmol/L


(3.5-5.1)


 


Chloride Level


 


 


 97 mmol/L


()


 


Carbon Dioxide Level


 


 


 26 mmol/L


(21-32)


 


Anion Gap   11 (6-14) 


 


Blood Urea Nitrogen


 


 


 23 mg/dL


(8-26)


 


Creatinine


 


 


 1.8 mg/dL


(0.7-1.3)


 


Estimated GFR


(Cockcroft-Gault) 


 


 36.8 





 


BUN/Creatinine Ratio   13 (6-20) 


 


Glucose Level


 


 


 191 mg/dL


(70-99)


 


Calcium Level


 


 


 8.6 mg/dL


(8.5-10.1)


 


Magnesium Level


 


 


 1.9 mg/dL


(1.8-2.4)


 


Total Bilirubin


 


 


 1.2 mg/dL


(0.2-1.0)


 


Aspartate Amino Transf


(AST/SGOT) 


 


 100 U/L


(15-37)


 


Alanine Aminotransferase


(ALT/SGPT) 


 


 154 U/L


(16-63)


 


Alkaline Phosphatase


 


 


 221 U/L


()


 


Troponin I Quantitative


 


 


 0.022 ng/mL


(0.000-0.055)


 


NT-Pro-B-Type Natriuretic


Peptide 


 


 998 pg/mL


(0-449)


 


Total Protein


 


 


 7.3 g/dL


(6.4-8.2)


 


Albumin


 


 


 2.5 g/dL


(3.4-5.0)


 


Albumin/Globulin Ratio   0.5 (1.0-1.7) 


 


Lipase


 


 


 69 U/L


()











Images


Images


Chest radiograph:


A frontal view of the chest is obtained. There is suspected focal right upper 

lobe interstitial infiltrate or scarring. There is no consolidation. There is no

pleural effusion or pneumothorax. There is a stable prominent cardiac silhouette

and evidence of prior CABG. There are stable chronic deformity of the left 

acromioclavicular joint.





IMPRESSION: Suspected focal right upper lobe interstitial infiltrate or 

scarring.





RUQ US:


Liver: Echogenic


Gallbladder: Internal echoes are seen within the gallbladder which could be from

sludge. Gallbladder wall measures up to about 6 mm with pericholecystic edema.


IVC: Partially distended at level of liver.


Common Bile Duct: Not dilated.


Pancreas: Not well seen secondary to overlying structures obscuring.


Right Kidney:  No hydronephrosis.





IMPRESSION:


*   Debris within the gallbladder with gallbladder wall thickening and 

pericholecystic edema. This wall thickening is a nonspecific finding and can be 

from primary gallbladder inflammation from cholecystitis, reactive to adjacent 

hepatic inflammation or a systemic process such as hypoproteinemia. If further 

evaluation of the gallbladder is desired nuclear perihilar scan could BE 

obtained. The patient also had gallbladder wall thickening on prior exam from 

2016.


*  Liver is echogenic. Nonspecific but can be seen with fatty infiltration.





VTE Prophylaxis Ordered


VTE Prophylaxis Devices:  Yes


VTE Pharmacological Prophylaxi:  No





Assessment/Plan


Assessment/Plan


A/P:


Epigastric abdominal pain - seems to be acute Cholecystitis, but concerning for 

possible prior findings. May also be 


Hypokalemia - will replace, check mag


JOSSE - vasomotor nephropathy, will hydrate gently, monitor renal function


Chest pain - likely GERD vs gastritis vs PUD, however with his CAD history will 

trend troponins, ask cardiology to see


Transaminitis - likely from gallbladder disease vs possible COVID 19, though he 

denies exposure


Elevated troponin - likely demand ischemia, will trend. cardiology consulted


DM2 - with hyperglycemia - will keep Npo, sliding scale


Anemia - likely of chronic disease


CAD s/p CABG x4 2016 - has outpatient f/u, taking meds


Abnormal CXR - will treat as pneumonia, likely gram negative given his age and 

risk factors. Will f/u COVID 19 results





FEN - NPO


PPX - SCDs


FULL CODE


Dispo - inpatient for above





COVID-19 CRITERIA:    The patient was evaluated during the global COVID-19 

pandemic, and that diagnosis was suspected/considered upon their initial 

presentation.  Their evaluation, treatment and testing was consistent with curr

ent guidelines for patients who present with complaints or symptoms that may be 

related to COVID-19.











Justifications for Admission


Other Justification














INOCENTE GAMA MD        Jan 21, 2021 14:20

## 2021-01-21 NOTE — PHYS DOC
Past Medical History


Past Medical History:  CAD, Diabetes-Type II, High Cholesterol, Hypertension


Past Surgical History:  Coronary Bypass Surgery, Other


Additional Past Surgical Histo:  Hernia, tooth extractions


Smoking Status:  Never Smoker


Alcohol Use:  Occasionally


Drug Use:  None





Adult General


Chief Complaint


Chief Complaint:  ABDOMINAL PAIN





HPI


HPI





Patient is a 75  year old male with history of hypertension, diabetes, coronary 

disease status post CABG who presents with epigastric pain and chest pain.  

Patient reports that he was seen in the emergency department on Saturday, 

January 16.  At that time he had some right-sided flank pain.  This has 

resolved.  On Sunday he developed epigastric pain.  Pain has been constant and 

nonradiating.  Nothing makes it better or worse.  He reports that he had a 

decrease in appetite.  He said some nausea without vomiting.  Patient denies 

diarrhea constipation shortness of breath fever syncope headache neck pain.  He 

has had some intermittent chills. Patient is not a smoker.  Denies daily drugs 

or alcohol.  Patient is accompanied by family member who helps with history.





Review of Systems


Review of Systems





Constitutional: Denies fever  [chills]


Eyes: Denies change in visual acuity, redness, or eye pain []


HENT: Denies nasal congestion or sore throat []


Respiratory: Denies cough or shortness of breath []


Cardiovascular: No additional information not addressed in HPI []


GI: Denies Nausea, vomiting, bloody stools or diarrhea []


:  Denies dysuria or hematuria []


Musculoskeletal: Denies back pain or joint pain []


Integument: Denies rash or skin lesions []


Neurologic: Denies headache, focal weakness or sensory changes []


Endocrine: Denies polyuria or polydipsia []





All other systems were reviewed and found to be within normal limits, except as 

documented in this note.





Current Medications


Current Medications





Current Medications








 Medications


  (Trade)  Dose


 Ordered  Sig/Jillian  Start Time


 Stop Time Status Last Admin


Dose Admin


 


 Azithromycin  250 ml @ 


 250 mls/hr  1X  ONCE  1/21/21 14:00


 1/21/21 14:59 DC 1/21/21 15:17


250 MLS/HR


 


 Ceftriaxone Sodium


  (Rocephin)  1 gm  1X  ONCE  1/21/21 14:00


 1/21/21 14:07 DC 1/21/21 15:12


1 GM


 


 Multi-Ingredient


 Mouthwash/Gargle


  (Gi Cocktail)  20 ml  1X  ONCE  1/21/21 10:30


 1/21/21 10:31 DC 1/21/21 10:49


20 ML


 


 Potassium


 Chloride/Water  100 ml @ 


 50 mls/hr  1X  ONCE  1/21/21 12:45


 1/21/21 14:44 DC 1/21/21 13:08


50 MLS/HR











Allergies


Allergies





Allergies








Coded Allergies Type Severity Reaction Last Updated Verified


 


  No Known Drug Allergies    5/24/17 No











Physical Exam


Physical Exam





C GENERAL APPEARANCE: Awake and alert. Cooperative. No acute distress. Non toxic

 appearing. 


HEAD: Normocephalic. Atraumatic.


EYES: EOM's grossly intact.  Sclera anicteric. Conjunctiva clear


ENT:. Airway patent. Mucous membranes moist. No trismus. Tolerating secretions.


NECK: Supple. Trachea midline.


HEART: Regular rate and rhythm. Radial pulses 2+. Good capillary refill. 


LUNGS: Respirations unlabored. Clear to auscultation bilaterally. No rales, 

rhonchi, wheezing or retractions. 


ABDOMEN: Soft. Non-tender. No guarding or rebound. No CVA tenderness. No 

palpable or pulsatile mass. 


EXTREMITIES: No acute deformities. No edema, erythema or calf tenderness. 


SKIN: Warm and dry. No rash. 


NEUROLOGICAL: Alert and oriented x3. No gross neurological deficits. Moves all 4

 extremities spontaneously.


PSYCHIATRIC: Normal mood.





Current Patient Data


Vital Signs





                                   Vital Signs








  Date Time  Temp Pulse Resp B/P (MAP) Pulse Ox O2 Delivery O2 Flow Rate FiO2


 


1/21/21 13:21  72  168/72 (104) 95 Room Air  


 


1/21/21 10:10 98.0  22     





 98.0       








Lab Values





                                Laboratory Tests








Test


 1/21/21


10:50 1/21/21


10:54 1/21/21


11:40 1/21/21


14:00


 


Urine Collection Type Unknown     


 


Urine Color Juanita     


 


Urine Clarity Clear     


 


Urine pH


 5.5 (<5.0-8.0)


 


 


 





 


Urine Specific Gravity


 1.025


(1.000-1.030) 


 


 





 


Urine Protein


 >=300 mg/dL


(NEG-TRACE) 


 


 





 


Urine Glucose (UA)


 100 mg/dL


(NEG) 


 


 





 


Urine Ketones (Stick)


 Negative mg/dL


(NEG) 


 


 





 


Urine Blood Trace (NEG)     


 


Urine Nitrite


 Negative (NEG)


 


 


 





 


Urine Bilirubin Small (NEG)     


 


Urine Urobilinogen Dipstick


 1.0 mg/dL (0.2


mg/dL) 


 


 





 


Urine Leukocyte Esterase


 Negative (NEG)


 


 


 





 


Urine RBC 0 /HPF (0-2)     


 


Urine WBC


 1-4 /HPF (0-4)


 


 


 





 


Urine Squamous Epithelial


Cells Few /LPF  


 


 


 





 


Urine Amorphous Sediment Present /HPF     


 


Urine Bacteria


 0 /HPF (0-FEW)


 


 


 





 


Urine Hyaline Casts Few /HPF     


 


Urine Granular Casts Few /HPF     


 


Glucose (Fingerstick)


 


 193 mg/dL


(70-99)  H 


 





 


White Blood Count


 


 


 10.8 x10^3/uL


(4.0-11.0) 





 


Red Blood Count


 


 


 4.01 x10^6/uL


(4.30-5.70)  L 





 


Hemoglobin


 


 


 12.4 g/dL


(13.0-17.5)  L 





 


Hematocrit


 


 


 36.4 %


(39.0-53.0)  L 





 


Mean Corpuscular Volume


 


 


 91 fL ()


 





 


Mean Corpuscular Hemoglobin   31 pg (25-35)   


 


Mean Corpuscular Hemoglobin


Concent 


 


 34 g/dL


(31-37) 





 


Red Cell Distribution Width


 


 


 14.1 %


(11.5-14.5) 





 


Platelet Count


 


 


 228 x10^3/uL


(140-400) 





 


Neutrophils (%) (Auto)   79 % (31-73)  H 


 


Lymphocytes (%) (Auto)   10 % (24-48)  L 


 


Monocytes (%) (Auto)   10 % (0-9)  H 


 


Eosinophils (%) (Auto)   1 % (0-3)   


 


Basophils (%) (Auto)   0 % (0-3)   


 


Neutrophils # (Auto)


 


 


 8.5 x10^3/uL


(1.8-7.7)  H 





 


Lymphocytes # (Auto)


 


 


 1.1 x10^3/uL


(1.0-4.8) 





 


Monocytes # (Auto)


 


 


 1.1 x10^3/uL


(0.0-1.1) 





 


Eosinophils # (Auto)


 


 


 0.1 x10^3/uL


(0.0-0.7) 





 


Basophils # (Auto)


 


 


 0.0 x10^3/uL


(0.0-0.2) 





 


Sodium Level


 


 


 134 mmol/L


(136-145)  L 





 


Potassium Level


 


 


 2.9 mmol/L


(3.5-5.1)  *L 





 


Chloride Level


 


 


 97 mmol/L


()  L 





 


Carbon Dioxide Level


 


 


 26 mmol/L


(21-32) 





 


Anion Gap   11 (6-14)   


 


Blood Urea Nitrogen


 


 


 23 mg/dL


(8-26) 





 


Creatinine


 


 


 1.8 mg/dL


(0.7-1.3)  H 





 


Estimated GFR


(Cockcroft-Gault) 


 


 36.8  


 





 


BUN/Creatinine Ratio   13 (6-20)   


 


Glucose Level


 


 


 191 mg/dL


(70-99)  H 





 


Calcium Level


 


 


 8.6 mg/dL


(8.5-10.1) 





 


Magnesium Level


 


 


 1.9 mg/dL


(1.8-2.4) 





 


Total Bilirubin


 


 


 1.2 mg/dL


(0.2-1.0)  H 





 


Aspartate Amino Transferase


(AST) 


 


 100 U/L


(15-37)  H 





 


Alanine Aminotransferase (ALT)


 


 


 154 U/L


(16-63)  H 





 


Alkaline Phosphatase


 


 


 221 U/L


()  H 





 


Troponin I Quantitative


 


 


 0.022 ng/mL


(0.000-0.055) 0.022 ng/mL


(0.000-0.055)


 


NT-Pro-B-Type Natriuretic


Peptide 


 


 998 pg/mL


(0-449)  H 





 


Total Protein


 


 


 7.3 g/dL


(6.4-8.2) 





 


Albumin


 


 


 2.5 g/dL


(3.4-5.0)  L 





 


Albumin/Globulin Ratio


 


 


 0.5 (1.0-1.7)


L 





 


Lipase


 


 


 69 U/L


()  L 








                                Laboratory Tests


1/21/21 11:40








                                Laboratory Tests


1/21/21 11:40











EKG


EKG


[] EKG interpretation shows normal sinus rhythm with ventricular rate of 74 bpm.

  DC interval 194 ms.  QRS duration 96 ms.  QTc 460 ms.  PAC noted.  No acute ST

 segment elevations.





Radiology/Procedures


Radiology/Procedures


[]EXAM: Chest, single view.





HISTORY: Chest pain.





COMPARISON: CT angiogram dated 1/17/2021.





FINDINGS: A frontal view of the chest is obtained. There is suspected focal 

right upper lobe interstitial infiltrate or scarring. There is no consolidation.

 There is no pleural effusion or pneumothorax. There is a stable prominent 

cardiac silhouette and evidence of prior CABG. There are stable chronic 

deformity of the left acromioclavicular joint.





IMPRESSION: Suspected focal right upper lobe interstitial infiltrate or 

scarring.





Electronically signed by: Ophelia Britt MD (1/21/2021 10:57 AM) [id number]











MPRESSION:





*   Debris within the gallbladder with gallbladder wall thickening and pericho

lecystic edema. This wall thickening is a nonspecific finding and can be from 

primary gallbladder inflammation from cholecystitis, reactive to adjacent 

hepatic inflammation or a systemic process such as hypoproteinemia. If further 

evaluation of the gallbladder is desired nuclear perihilar scan could BE 

obtained. The patient also had gallbladder wall thickening on prior exam from 

2016.





*  Liver is echogenic. Nonspecific but can be seen with fatty infiltration.





Electronically signed by: Marian Poole MD (1/21/2021 1:32 PM) [id number]














DICTATED and SIGNED BY:     MARIAN POLOE MD


DATE:     01/21/21 [id number]





Course & Med Decision Making


Course & Med Decision Making


Pertinent Labs and Imaging studies reviewed. (See chart for details)





[]MDM: 75-year-old male presents emergency department for epigastric pain for 

the past 5 days.  Here in the emergency department patient appears no acute 

distress.  Vital signs stable.  Abdomen is soft and nonperitoneal.  Patient 

declines pain medication at this time.  Patient has no leukocytosis.  Potassium 

2.9.  Started replacement in the emergency department.  Creatinine of 1.8.  It 

was 1.5 several days ago.  AST ALT and total bilirubin elevated compared to 

previous labs 5 days ago.  Troponin negative.  Chest x-ray shows suspected focal

 right upper lobe interstitial infiltrate or scarring.  Patient was given 

azithromycin and Rocephin. US abdomen shows debris within the gallbladder and 

gallbladder wall thickening with pericholecystic edema. liver is echogenic.  It 

speak with the hospitalist Dr. Echavarria.  Accepts admission. COVID pending.  Spoke 

with general surgeon Dr. Anguiano will see patient in consultation.  At this time 

we will admit patient to the hospital.  Spoke with patient.  Agreeable to 

admission.  Is aware of all labs and imaging.  All questions answered and 

patient is stable at time of admission.





Dragon Disclaimer


Dragon Disclaimer


This electronic medical record was generated, in whole or in part, using a voice

 recognition dictation system.





Departure


Departure


Impression:  


   Primary Impression:  


   Cholecystitis


   Additional Impressions:  


   Hypokalemia


   Abdominal pain


   Transaminitis


Disposition:  09 ADMITTED AS INPT THIS HOSP


Admitting Physician:  HIMS (RIFLE)


Condition:  STABLE


Referrals:  


GABRIELLA RASHID MD (PCP)





Problem Qualifiers











MOLLY GONCALVES DO    Jan 21, 2021 10:27

## 2021-01-21 NOTE — EKG
[facility]

              [address] [place] [address]

Test Date:    2021               Test Time:    10:55:00

Pat Name:     KIN HUNT       Department:   

Patient ID:   [id number]           Room:          

Gender:       M                        Technician:   

:          [id number]               Requested By: MOLLY GONCALVES

Order Number: [id number]           Reading MD:     

                                 Measurements

Intervals                              Axis          

Rate:         74                       P:            0

IA:           194                      QRS:          53

QRSD:         96                       T:            41

QT:           374                                    

QTc:          416                                    

                           Interpretive Statements

SINUS RHYTHM

ATRIAL PREMATURE COMPLEX(ES)

QRS(T) CONTOUR ABNORMALITY

CONSIDER ANTEROSEPTAL MYOCARDIAL DAMAGE

CONSISTENT WITH INFERIOR INFARCT

PROBABLY OLD

ABNORMAL ECG

RI6.01

No previous ECG available for comparison

## 2021-01-22 VITALS — SYSTOLIC BLOOD PRESSURE: 138 MMHG | DIASTOLIC BLOOD PRESSURE: 65 MMHG

## 2021-01-22 VITALS — SYSTOLIC BLOOD PRESSURE: 152 MMHG | DIASTOLIC BLOOD PRESSURE: 63 MMHG

## 2021-01-22 VITALS — SYSTOLIC BLOOD PRESSURE: 132 MMHG | DIASTOLIC BLOOD PRESSURE: 62 MMHG

## 2021-01-22 VITALS — DIASTOLIC BLOOD PRESSURE: 62 MMHG | SYSTOLIC BLOOD PRESSURE: 137 MMHG

## 2021-01-22 VITALS — SYSTOLIC BLOOD PRESSURE: 154 MMHG | DIASTOLIC BLOOD PRESSURE: 68 MMHG

## 2021-01-22 LAB
ALBUMIN SERPL-MCNC: 2.2 G/DL (ref 3.4–5)
ALBUMIN/GLOB SERPL: 0.6 {RATIO} (ref 1–1.7)
ALP SERPL-CCNC: 268 U/L (ref 46–116)
ALT SERPL-CCNC: 136 U/L (ref 16–63)
ANION GAP SERPL CALC-SCNC: 11 MMOL/L (ref 6–14)
AST SERPL-CCNC: 95 U/L (ref 15–37)
BASOPHILS # BLD AUTO: 0 X10^3/UL (ref 0–0.2)
BASOPHILS NFR BLD: 0 % (ref 0–3)
BILIRUB SERPL-MCNC: 0.8 MG/DL (ref 0.2–1)
BUN SERPL-MCNC: 16 MG/DL (ref 8–26)
BUN/CREAT SERPL: 11 (ref 6–20)
CALCIUM SERPL-MCNC: 7.4 MG/DL (ref 8.5–10.1)
CHLORIDE SERPL-SCNC: 105 MMOL/L (ref 98–107)
CHOLEST SERPL-MCNC: 124 MG/DL (ref 0–200)
CHOLEST/HDLC SERPL: 11.3 {RATIO}
CO2 SERPL-SCNC: 25 MMOL/L (ref 21–32)
CREAT SERPL-MCNC: 1.5 MG/DL (ref 0.7–1.3)
EOSINOPHIL NFR BLD: 0.1 X10^3/UL (ref 0–0.7)
EOSINOPHIL NFR BLD: 1 % (ref 0–3)
ERYTHROCYTE [DISTWIDTH] IN BLOOD BY AUTOMATED COUNT: 14 % (ref 11.5–14.5)
GFR SERPLBLD BASED ON 1.73 SQ M-ARVRAT: 45.4 ML/MIN
GLUCOSE SERPL-MCNC: 89 MG/DL (ref 70–99)
HCT VFR BLD CALC: 33.2 % (ref 39–53)
HDLC SERPL-MCNC: 11 MG/DL (ref 40–60)
HGB BLD-MCNC: 11.6 G/DL (ref 13–17.5)
LDLC: 76 MG/DL (ref 0–100)
LYMPHOCYTES # BLD: 1.1 X10^3/UL (ref 1–4.8)
LYMPHOCYTES NFR BLD AUTO: 14 % (ref 24–48)
MCH RBC QN AUTO: 32 PG (ref 25–35)
MCHC RBC AUTO-ENTMCNC: 35 G/DL (ref 31–37)
MCV RBC AUTO: 91 FL (ref 79–100)
MONO #: 0.9 X10^3/UL (ref 0–1.1)
MONOCYTES NFR BLD: 12 % (ref 0–9)
NEUT #: 5.5 X10^3/UL (ref 1.8–7.7)
NEUTROPHILS NFR BLD AUTO: 72 % (ref 31–73)
PLATELET # BLD AUTO: 223 X10^3/UL (ref 140–400)
POTASSIUM SERPL-SCNC: 3.3 MMOL/L (ref 3.5–5.1)
PROT SERPL-MCNC: 5.8 G/DL (ref 6.4–8.2)
RBC # BLD AUTO: 3.66 X10^6/UL (ref 4.3–5.7)
SODIUM SERPL-SCNC: 141 MMOL/L (ref 136–145)
TRIGL SERPL-MCNC: 184 MG/DL (ref 0–150)
VLDLC: 37 MG/DL (ref 0–40)
WBC # BLD AUTO: 7.7 X10^3/UL (ref 4–11)

## 2021-01-22 PROCEDURE — 0FT44ZZ RESECTION OF GALLBLADDER, PERCUTANEOUS ENDOSCOPIC APPROACH: ICD-10-PCS | Performed by: SURGERY

## 2021-01-22 RX ADMIN — METOPROLOL TARTRATE SCH MG: 5 INJECTION INTRAVENOUS at 11:47

## 2021-01-22 RX ADMIN — INSULIN LISPRO SCH UNITS: 100 INJECTION, SOLUTION INTRAVENOUS; SUBCUTANEOUS at 17:03

## 2021-01-22 RX ADMIN — PIPERACILLIN SODIUM AND TAZOBACTAM SODIUM SCH MLS/HR: 2; .25 INJECTION, POWDER, LYOPHILIZED, FOR SOLUTION INTRAVENOUS at 06:18

## 2021-01-22 RX ADMIN — METOPROLOL TARTRATE SCH MG: 25 TABLET ORAL at 21:44

## 2021-01-22 RX ADMIN — PIPERACILLIN SODIUM AND TAZOBACTAM SODIUM SCH MLS/HR: 2; .25 INJECTION, POWDER, LYOPHILIZED, FOR SOLUTION INTRAVENOUS at 17:01

## 2021-01-22 RX ADMIN — METOPROLOL TARTRATE SCH MG: 5 INJECTION INTRAVENOUS at 06:19

## 2021-01-22 RX ADMIN — PIPERACILLIN SODIUM AND TAZOBACTAM SODIUM SCH MLS/HR: 2; .25 INJECTION, POWDER, LYOPHILIZED, FOR SOLUTION INTRAVENOUS at 11:46

## 2021-01-22 RX ADMIN — Medication SCH CAP: at 21:44

## 2021-01-22 RX ADMIN — INSULIN LISPRO SCH UNITS: 100 INJECTION, SOLUTION INTRAVENOUS; SUBCUTANEOUS at 21:54

## 2021-01-22 RX ADMIN — INSULIN LISPRO SCH UNITS: 100 INJECTION, SOLUTION INTRAVENOUS; SUBCUTANEOUS at 00:00

## 2021-01-22 RX ADMIN — BACITRACIN SCH MLS/HR: 5000 INJECTION, POWDER, FOR SOLUTION INTRAMUSCULAR at 21:44

## 2021-01-22 RX ADMIN — INSULIN LISPRO SCH UNITS: 100 INJECTION, SOLUTION INTRAVENOUS; SUBCUTANEOUS at 06:00

## 2021-01-22 RX ADMIN — BACITRACIN SCH MLS/HR: 5000 INJECTION, POWDER, FOR SOLUTION INTRAMUSCULAR at 10:03

## 2021-01-22 RX ADMIN — OXYCODONE HYDROCHLORIDE AND ACETAMINOPHEN PRN TAB: 5; 325 TABLET ORAL at 15:23

## 2021-01-22 RX ADMIN — INSULIN LISPRO SCH UNITS: 100 INJECTION, SOLUTION INTRAVENOUS; SUBCUTANEOUS at 11:18

## 2021-01-22 NOTE — PDOC
[facility] PROGRESS NOTE


Date of Service


DOS:


DATE: 1/22/21 


TIME: 08:34





Chief Complaint


Chief Complaint


A/P:


Epigastric abdominal pain - seems to be acute Cholecystitis, but concerning for 

possible prior findings. May also be 


Hypokalemia - will replace, check mag


JOSSE - vasomotor nephropathy, will hydrate gently, monitor renal function


Chest pain - likely GERD vs gastritis vs PUD, however with his CAD history will 

trend troponins, ask cardiology to see


Transaminitis - likely from gallbladder disease vs possible COVID 19, though he 

denies exposure


Elevated troponin - likely demand ischemia, will trend. cardiology consulted


DM2 - with hyperglycemia - will keep Npo, sliding scale


Anemia - likely of chronic disease


CAD s/p CABG x4 2016 - has outpatient f/u, taking meds


Abnormal CXR - will treat as pneumonia, likely gram negative given his age and 

risk factors. Will f/u COVID 19 results





FEN - NPO


PPX - SCDs


FULL CODE


Dispo - inpatient for above





History of Present Illness


History of Present Illness


Mr Dhaliwal is a 75  year old male with history of hypertension, diabetes, 

coronary disease status post CABG x4 who presents to the ED with his daughter 

with epigastric pain and chest pain as well as right flank pain.


Pain is epigastric and substernal, sharp, intermittent, 7/10 with occasional 

radiation to right flank, though it is not currently radiating. Food does not 

affect his pain. No recent sick contacts. He does have some shortness of breath 

that is intermittent. No cough. Occasional chills.


He was seen in the emergency department on Saturday, January 16 for flank pain. 

Nothing makes it better or worse.  He reports that he had a decrease in appetite

.  He said some nausea without vomiting.  Patient denies diarrhea, no fevers, no

syncope headache neck pain.  He has had some intermittent chills. Patient is not

a smoker.  Denies daily drugs or alcohol.  Patient is accompanied by family 

member who helps with history.


EKG appears normal sinus rhythm with ventricular rate of 74 bpm.  WY interval 

194 ms.  QRS duration 96 ms.  QTc 460 ms.  PAC noted.  No acute ST segment 

elevations.


Chest radiograph with suspected focal right upper lobe interstitial infiltrate 

or scarring.


RUQ US with debris within the gallbladder with gallbladder wall thickening and 

pericholecystic edema. Echogenic liver.


Labs significant for WBC 10.8 with left shift, Hb 12.4, platelets 228, , 

K2.9, BUN 23, CR 1.8, glucose 191, albumin 2.5, alk phos 221, , , 

bilirubin 1.2, lipase 69, , troponin 0 0.022 x 2


Admitted for further care.








1/22: Patient seen and evaluated, s/p laparoscopic cholecystectomy.  Patient was

febrile overnight.  Reports some incisional tenderness.  Chest x-ray on 

admission showed suspected focal right upper lobe interstitial infiltrate or 

scarring.  COVID-19  and influenza pending.





Vitals/I&O


Vitals/I&O:





                                   Vital Signs








  Date Time  Temp Pulse Resp B/P (MAP) Pulse Ox O2 Delivery O2 Flow Rate FiO2


 


1/22/21 08:18      Mask 13 


 


1/22/21 08:18 98.2 74 29 138/58 99   





 98.2       














                                    I & O   


 


 1/21/21 1/21/21 1/22/21





 15:00 23:00 07:00


 


Intake Total  0 ml 0 ml


 


Output Total  300 ml 300 ml


 


Balance  -300 ml -300 ml











Physical Exam


General:  Alert, Oriented X3, Cooperative, No acute distress


Heart:  Regular rate, No murmurs


Lungs:  Clear


Abdomen:  Normal bowel sounds, Soft, Other (Tender to palpation right upper 

quadrant)


Extremities:  No edema


Skin:  No significant lesion





Labs


Labs:





Laboratory Tests








Test


 1/21/21


10:50 1/21/21


10:54 1/21/21


11:40 1/21/21


14:00


 


Urine Collection Type Unknown    


 


Urine Color Juanita    


 


Urine Clarity Clear    


 


Urine pH 5.5 (<5.0-8.0)    


 


Urine Specific Gravity


 1.025


(1.000-1.030) 


 


 





 


Urine Protein


 >=300 mg/dL


(NEG-TRACE) 


 


 





 


Urine Glucose (UA)


 100 mg/dL


(NEG) 


 


 





 


Urine Ketones (Stick)


 Negative mg/dL


(NEG) 


 


 





 


Urine Blood Trace (NEG)    


 


Urine Nitrite Negative (NEG)    


 


Urine Bilirubin Small (NEG)    


 


Urine Urobilinogen Dipstick


 1.0 mg/dL (0.2


mg/dL) 


 


 





 


Urine Leukocyte Esterase Negative (NEG)    


 


Urine RBC 0 /HPF (0-2)    


 


Urine WBC 1-4 /HPF (0-4)    


 


Urine Squamous Epithelial


Cells Few /LPF 


 


 


 





 


Urine Amorphous Sediment Present /HPF    


 


Urine Bacteria 0 /HPF (0-FEW)    


 


Urine Hyaline Casts Few /HPF    


 


Urine Granular Casts Few /HPF    


 


Glucose (Fingerstick)


 


 193 mg/dL


(70-99) 


 





 


White Blood Count


 


 


 10.8 x10^3/uL


(4.0-11.0) 





 


Red Blood Count


 


 


 4.01 x10^6/uL


(4.30-5.70) 





 


Hemoglobin


 


 


 12.4 g/dL


(13.0-17.5) 





 


Hematocrit


 


 


 36.4 %


(39.0-53.0) 





 


Mean Corpuscular Volume   91 fL ()  


 


Mean Corpuscular Hemoglobin   31 pg (25-35)  


 


Mean Corpuscular Hemoglobin


Concent 


 


 34 g/dL


(31-37) 





 


Red Cell Distribution Width


 


 


 14.1 %


(11.5-14.5) 





 


Platelet Count


 


 


 228 x10^3/uL


(140-400) 





 


Neutrophils (%) (Auto)   79 % (31-73)  


 


Lymphocytes (%) (Auto)   10 % (24-48)  


 


Monocytes (%) (Auto)   10 % (0-9)  


 


Eosinophils (%) (Auto)   1 % (0-3)  


 


Basophils (%) (Auto)   0 % (0-3)  


 


Neutrophils # (Auto)


 


 


 8.5 x10^3/uL


(1.8-7.7) 





 


Lymphocytes # (Auto)


 


 


 1.1 x10^3/uL


(1.0-4.8) 





 


Monocytes # (Auto)


 


 


 1.1 x10^3/uL


(0.0-1.1) 





 


Eosinophils # (Auto)


 


 


 0.1 x10^3/uL


(0.0-0.7) 





 


Basophils # (Auto)


 


 


 0.0 x10^3/uL


(0.0-0.2) 





 


Sodium Level


 


 


 134 mmol/L


(136-145) 





 


Potassium Level


 


 


 2.9 mmol/L


(3.5-5.1) 





 


Chloride Level


 


 


 97 mmol/L


() 





 


Carbon Dioxide Level


 


 


 26 mmol/L


(21-32) 





 


Anion Gap   11 (6-14)  


 


Blood Urea Nitrogen


 


 


 23 mg/dL


(8-26) 





 


Creatinine


 


 


 1.8 mg/dL


(0.7-1.3) 





 


Estimated GFR


(Cockcroft-Gault) 


 


 36.8 


 





 


BUN/Creatinine Ratio   13 (6-20)  


 


Glucose Level


 


 


 191 mg/dL


(70-99) 





 


Calcium Level


 


 


 8.6 mg/dL


(8.5-10.1) 





 


Magnesium Level


 


 


 1.9 mg/dL


(1.8-2.4) 





 


Total Bilirubin


 


 


 1.2 mg/dL


(0.2-1.0) 





 


Aspartate Amino Transf


(AST/SGOT) 


 


 100 U/L


(15-37) 





 


Alanine Aminotransferase


(ALT/SGPT) 


 


 154 U/L


(16-63) 





 


Alkaline Phosphatase


 


 


 221 U/L


() 





 


Troponin I Quantitative


 


 


 0.022 ng/mL


(0.000-0.055) 0.022 ng/mL


(0.000-0.055)


 


NT-Pro-B-Type Natriuretic


Peptide 


 


 998 pg/mL


(0-449) 





 


Total Protein


 


 


 7.3 g/dL


(6.4-8.2) 





 


Albumin


 


 


 2.5 g/dL


(3.4-5.0) 





 


Albumin/Globulin Ratio   0.5 (1.0-1.7)  


 


Lipase


 


 


 69 U/L


() 





 


Test


 1/21/21


15:08 1/21/21


18:43 1/21/21


19:30 1/21/21


23:55


 


SARS-CoV-2 Antigen (Rapid)


 Negative


(NEGATIVE) 


 


 





 


Glucose (Fingerstick)


 


 217 mg/dL


(70-99) 


 121 mg/dL


(70-99)


 


Prothrombin Time


 


 


 14.4 SEC


(11.7-14.0) 





 


Prothromb Time International


Ratio 


 


 1.2 (0.8-1.1) 


 





 


Sodium Level


 


 


 139 mmol/L


(136-145) 





 


Potassium Level


 


 


 3.2 mmol/L


(3.5-5.1) 





 


Chloride Level


 


 


 101 mmol/L


() 





 


Carbon Dioxide Level


 


 


 25 mmol/L


(21-32) 





 


Anion Gap   13 (6-14)  


 


Blood Urea Nitrogen


 


 


 20 mg/dL


(8-26) 





 


Creatinine


 


 


 1.6 mg/dL


(0.7-1.3) 





 


Estimated GFR


(Cockcroft-Gault) 


 


 42.1 


 





 


Glucose Level


 


 


 201 mg/dL


(70-99) 





 


Calcium Level


 


 


 7.8 mg/dL


(8.5-10.1) 





 


Troponin I Quantitative


 


 


 0.033 ng/mL


(0.000-0.055) 





 


Test


 1/22/21


04:30 


 


 





 


White Blood Count


 7.7 x10^3/uL


(4.0-11.0) 


 


 





 


Red Blood Count


 3.66 x10^6/uL


(4.30-5.70) 


 


 





 


Hemoglobin


 11.6 g/dL


(13.0-17.5) 


 


 





 


Hematocrit


 33.2 %


(39.0-53.0) 


 


 





 


Mean Corpuscular Volume 91 fL ()    


 


Mean Corpuscular Hemoglobin 32 pg (25-35)    


 


Mean Corpuscular Hemoglobin


Concent 35 g/dL


(31-37) 


 


 





 


Red Cell Distribution Width


 14.0 %


(11.5-14.5) 


 


 





 


Platelet Count


 223 x10^3/uL


(140-400) 


 


 





 


Neutrophils (%) (Auto) 72 % (31-73)    


 


Lymphocytes (%) (Auto) 14 % (24-48)    


 


Monocytes (%) (Auto) 12 % (0-9)    


 


Eosinophils (%) (Auto) 1 % (0-3)    


 


Basophils (%) (Auto) 0 % (0-3)    


 


Neutrophils # (Auto)


 5.5 x10^3/uL


(1.8-7.7) 


 


 





 


Lymphocytes # (Auto)


 1.1 x10^3/uL


(1.0-4.8) 


 


 





 


Monocytes # (Auto)


 0.9 x10^3/uL


(0.0-1.1) 


 


 





 


Eosinophils # (Auto)


 0.1 x10^3/uL


(0.0-0.7) 


 


 





 


Basophils # (Auto)


 0.0 x10^3/uL


(0.0-0.2) 


 


 





 


Sodium Level


 141 mmol/L


(136-145) 


 


 





 


Potassium Level


 3.3 mmol/L


(3.5-5.1) 


 


 





 


Chloride Level


 105 mmol/L


() 


 


 





 


Carbon Dioxide Level


 25 mmol/L


(21-32) 


 


 





 


Anion Gap 11 (6-14)    


 


Blood Urea Nitrogen


 16 mg/dL


(8-26) 


 


 





 


Creatinine


 1.5 mg/dL


(0.7-1.3) 


 


 





 


Estimated GFR


(Cockcroft-Gault) 45.4 


 


 


 





 


BUN/Creatinine Ratio 11 (6-20)    


 


Glucose Level


 89 mg/dL


(70-99) 


 


 





 


Calcium Level


 7.4 mg/dL


(8.5-10.1) 


 


 





 


Total Bilirubin


 0.8 mg/dL


(0.2-1.0) 


 


 





 


Aspartate Amino Transf


(AST/SGOT) 95 U/L (15-37) 


 


 


 





 


Alanine Aminotransferase


(ALT/SGPT) 136 U/L


(16-63) 


 


 





 


Alkaline Phosphatase


 268 U/L


() 


 


 





 


Troponin I Quantitative


 0.046 ng/mL


(0.000-0.055) 


 


 





 


Total Protein


 5.8 g/dL


(6.4-8.2) 


 


 





 


Albumin


 2.2 g/dL


(3.4-5.0) 


 


 





 


Albumin/Globulin Ratio 0.6 (1.0-1.7)    


 


Triglycerides Level


 184 mg/dL


(0-150) 


 


 





 


Cholesterol Level


 124 mg/dL


(0-200) 


 


 





 


LDL Cholesterol, Calculated


 76 mg/dL


(0-100) 


 


 





 


VLDL Cholesterol, Calculated


 37 mg/dL


(0-40) 


 


 





 


Non-HDL Cholesterol Calculated


 113 mg/dL


(0-129) 


 


 





 


HDL Cholesterol


 11 mg/dL


(40-60) 


 


 





 


Cholesterol/HDL Ratio 11.3    











Assessment and Plan


Assessmemt and Plan


Problems


Medical Problems:


(1) Abdominal pain


Status: Acute  





(2) Cholecystitis


Status: Acute  





(3) Hypokalemia


Status: Acute  





(4) Transaminitis


Status: Acute  











Comment


Review of Relevant


I have reviewed the following items fernando (where applicable) has been applied.


Medications:





Current Medications








 Medications


  (Trade)  Dose


 Ordered  Sig/Jillian


 Route


 PRN Reason  Start Time


 Stop Time Status Last Admin


Dose Admin


 


 Multi-Ingredient


 Mouthwash/Gargle


  (Gi Cocktail)  20 ml  1X  ONCE


 SWSW


   1/21/21 10:30


 1/21/21 10:31 DC 1/21/21 10:49





 


 Potassium


 Chloride/Water  100 ml @ 


 50 mls/hr  1X  ONCE


 IV


   1/21/21 12:45


 1/21/21 14:44 DC 1/21/21 13:08





 


 Ceftriaxone Sodium


  (Rocephin)  1 gm  1X  ONCE


 IVP


   1/21/21 14:00


 1/21/21 14:07 DC 1/21/21 15:12





 


 Azithromycin  250 ml @ 


 250 mls/hr  1X  ONCE


 IV


   1/21/21 14:00


 1/21/21 14:59 DC 1/21/21 15:17





 


 Sodium Chloride  1,000 ml @ 


 75 mls/hr  [id number]


 IV


   1/21/21 14:15


 1/21/21 16:59 DC 1/21/21 14:15





 


 Insulin Human


 Lispro


  (HumaLOG)  0-9 UNITS  Q6HRS


 SQ


   1/21/21 18:00


    1/21/21 18:57





 


 Piperacillin Sod/


 Tazobactam Sod


 3.375 gm/Sodium


 Chloride  50 ml @ 


 100 mls/hr  1X  ONCE


 IV


   1/21/21 16:00


 1/21/21 16:29 DC 1/21/21 17:30





 


 Piperacillin Sod/


 Tazobactam Sod


 2.25 gm/Sodium


 Chloride  50 ml @ 


 100 mls/hr  Q6HRS


 IV


   1/21/21 23:00


    1/22/21 06:18





 


 Metoprolol


 Tartrate


  (Lopressor Vial)  5 mg  Q6HRS


 IVP


   1/21/21 18:00


    1/22/21 06:19





 


 Potassium


 Chloride/Sodium


 Chloride  1,000 ml @ 


 75 mls/hr  [id number]


 IV


   1/21/21 17:00


 1/22/21 06:19 DC 1/21/21 18:31





 


 Bupivacaine HCl/


 Epinephrine Bitart


  (Sensorcain-Epi


 0.25% Kit)  30 ml  STK-MED ONCE


 .ROUTE


   1/22/21 06:58


 1/22/21 06:58 DC 1/22/21 07:33














Justifications for Admission


Other Justification














DERIC WALL MD            Jan 22, 2021 08:41

## 2021-01-22 NOTE — PDOC4
Operative Note


Operative Note


Date: January 22 of 2021 at 0801


Preoperative diagnosis: Acute cholecystitis


Postoperative diagnosis: Same


Procedure: Laparoscopic cholecystectomy


Surgeon: Silvio


Specimen: Gallbladder


Dictation: Patient is 75-year-old male is admitted to the hospital with right 

upper quadrant abdominal pain ultrasound showing gallstones and thickened 

gallbladder wall consistent with acute cholecystitis.  Procedure of laparoscopic

cholecystectomy was explained to the patient in detail risk benefits were also 

discussed including bleeding infection injury to intra-abdominal contents 

possible necessitating further or open operations alternatives to this procedure

also discussed with the patient who seemed to understand and gave both verbal 

and written consent to have the procedure performed.  Patient was taken to the 

operating room placed in the supine position general anesthesia was initiated 

once patient was sleeping intubated his abdomen was prepped and draped usual 

sterile fashion using ChloraPrep.  An area just below the umbilicus was injected

with quarter percent Marcaine with epinephrine incision was made with 11 blade 

scalpel and a varies needle was placed within the abdomen creating 

pneumoperitoneum once this was complete 11 mm port was placed and a 5 mm 

camera's placed within the abdomen which was inspected was noted the gallbladder

was thickened and omentum was caked to the gallbladder and adhesed to the 

anterior abdominal wall.  A 5 mm port was placed in the epigastrium a 5 mm port 

was placed in the right midabdomen and a 5 mm port was placed in the right 

lateral abdomen.  Adhesions to the abdominal wall were taken down with blunt 

dissection this exposed the dome of the gallbladder which was grasped retracted 

cephalad the omental adhesions to the gallbladder were taken down with blunt 

dissection down to the triangle.  The infundibulum the gallbladder is grasped 

retracted laterally the adherent tissues of the triangle were taken down with 

blunt dissection exposing the cystic duct and cystic artery both were doubly 

clipped and transected the clip on the gallbladder side did not hold and fair 

amount of purulent material was expressed from the gallbladder this was suction 

from the abdomen the gallbladder was then taken off the liver with hook 

electrocautery placed in Endo Catch bag removed from the umbilicus the right 

upper quadrant was irrigated with copious amounts normal saline and suctioned 

dry hemostasis deemed to be appropriate the pneumoperitoneum was reduced and the

ports removed the fascial defect at the umbilicus closed with a figure-of-eight 

0 Vicryl suture and the skin was reapproximated all port sites for subcuticular 

Monocryl Mastisol Steri-Strips and island dressings were applied.  Patient was 

awakened and extubated in the operating room taken to recovery in stable 

condition all sponge instrument needle counts listed as correct estimated blood 

loss 20 mL











KAREL GLASS MD             Jan 22, 2021 08:04

## 2021-01-22 NOTE — PDOC2
CONSULT


Date of Consult


Date of Consult


DATE: 1/22/21 


TIME: 06:57





Reason for Consult


Reason for Consult:


Right upper quadrant abdominal pain





Referring Physician


Referring Physician:


Laurie





Identification/Chief Complaint


Chief Complaint


Abdominal pain right upper quadrant epigastrium





Source


Source:  Chart review, Patient





History of Present Illness


Reason for Visit:


75-year-old male come to the emergency department with complaints of right upper

quadrant abdominal pain radiating to the epigastrium nausea no vomiting.  Does 

have cardiac history and had a cardiology consult does not appear to be cardiac 

in nature.  Ultrasound showed distended gallbladder with thickened gallbladder 

wall pericholecystic inflammation and debris within the gallbladder





Past Medical History


Cardiovascular:  CAD, HTN, Hyperlipidemia


Pulmonary:  No pertinent hx


CENTRAL NERVOUS SYSTEM:  Other


GI:  No pertinent hx


Heme/Onc:  No pertinent hx


Hepatobiliary:  No pertinent hx


Psych:  No pertinent hx


Musculoskeletal:  Osteoarthritis


Rheumatologic:  No pertinent hx


Infectious disease:  No pertinent hx


Renal/:  No pertinent hx


Endocrine:  Diabetes





Past Surgical History


Past Surgical History:  CABG, Cataract Removal, Hernia Repair





Family History


Family History:  Coronary Artery Disease (brother)





Social History


Quit


ALCOHOL:  none


Drugs:  None


Lives:  with Family





Current Problem List


Problem List


Problems


Medical Problems:


(1) Abdominal pain


Status: Acute  





(2) Cholecystitis


Status: Acute  





(3) Hypokalemia


Status: Acute  





(4) Transaminitis


Status: Acute  











Current Medications


Current Medications





Current Medications


Multi-Ingredient Mouthwash/Gargle (Gi Cocktail) 20 ml 1X  ONCE SWSW  Last 

administered on 1/21/21at 10:49;  Start 1/21/21 at 10:30;  Stop 1/21/21 at 

10:31;  Status DC


Potassium Chloride/Water 100 ml @  50 mls/hr 1X  ONCE IV  Last administered on 

1/21/21at 13:08;  Start 1/21/21 at 12:45;  Stop 1/21/21 at 14:44;  Status DC


Ceftriaxone Sodium (Rocephin) 1 gm 1X  ONCE IVP  Last administered on 1/21/21at 

15:12;  Start 1/21/21 at 14:00;  Stop 1/21/21 at 14:07;  Status DC


Azithromycin 250 ml @  250 mls/hr 1X  ONCE IV  Last administered on 1/21/21at 

15:17;  Start 1/21/21 at 14:00;  Stop 1/21/21 at 14:59;  Status DC


Ondansetron HCl (Zofran) 4 mg PRN Q8HRS  PRN IV NAUSEA/VOMITING;  Start 1/21/21 

at 14:15;  Stop 1/21/21 at 15:33;  Status DC


Morphine Sulfate (Morphine Sulfate) 4 mg PRN Q2HR  PRN IV MODERATE TO SEVERE 

PAIN;  Start 1/21/21 at 14:15;  Stop 1/22/21 at 14:14


Sodium Chloride 1,000 ml @  75 mls/hr [id number] IV  Last administered on 1/21/21at

14:15;  Start 1/21/21 at 14:15;  Stop 1/21/21 at 16:59;  Status DC


Ondansetron HCl (Zofran) 4 mg PRN Q4HRS  PRN IV NAUSEA/VOMITING;  Start 1/21/21 

at 15:30


Insulin Human Lispro (HumaLOG) 0-9 UNITS Q6HRS SQ  Last administered on 

1/21/21at 18:57;  Start 1/21/21 at 18:00


Dextrose (Dextrose 50%-Water Syringe) 12.5 gm PRN Q15MIN  PRN IV SEE COMMENTS;  

Start 1/21/21 at 15:30


Piperacillin Sod/ Tazobactam Sod 3.375 gm/Sodium Chloride 50 ml @  100 mls/hr 1X

 ONCE IV  Last administered on 1/21/21at 17:30;  Start 1/21/21 at 16:00;  Stop 

1/21/21 at 16:29;  Status DC


Piperacillin Sod/ Tazobactam Sod (Zosyn Per Pharmacy) 1 each PRN DAILY  PRN [facility] 

SEE COMMENTS;  Start 1/21/21 at 15:30


Piperacillin Sod/ Tazobactam Sod 2.25 gm/Sodium Chloride 50 ml @  100 mls/hr Q6

HRS IV  Last administered on 1/22/21at 06:18;  Start 1/21/21 at 23:00


Metoprolol Tartrate (Lopressor Vial) 5 mg Q6HRS IVP  Last administered on 

1/22/21at 06:19;  Start 1/21/21 at 18:00


Potassium Chloride/Sodium Chloride 1,000 ml @  75 mls/hr [id number] IV  Last 

administered on 1/21/21at 18:31;  Start 1/21/21 at 17:00;  Stop 1/22/21 at 

06:19;  Status DC


Sodium Chloride 1,000 ml @  75 mls/hr [id number] IV ;  Start 1/22/21 at 06:20


Fentanyl Citrate (Fentanyl 2ml Vial) 25 mcg PRN Q5MIN  PRN IVP MILD PAIN 1-3;  

Start 1/22/21 at 06:45;  Stop 1/23/21 at 06:44


Fentanyl Citrate (Fentanyl 2ml Vial) 50 mcg PRN Q5MIN  PRN IVP MODERATE PAIN 4-

6;  Start 1/22/21 at 06:45;  Stop 1/23/21 at 06:44


Morphine Sulfate (Morphine Sulfate) 1 mg PRN Q10MIN  PRN IVP SEVERE PAIN 7-10;  

Start 1/22/21 at 06:45;  Stop 1/23/21 at 06:44


Ringer's Solution 1,000 ml @  30 mls/hr Q24H IV ;  Start 1/22/21 at 06:45;  Stop

1/22/21 at 18:44


Hydromorphone HCl (Dilaudid) 0.5 mg PRN Q10MIN  PRN IVP SEVERE PAIN 7-10, 2nd 

CHOICE;  Start 1/22/21 at 06:45;  Stop 1/23/21 at 06:44


Prochlorperazine Edisylate (Compazine) 5 mg PACU PRN  PRN IVP NAUSEA, MRX1;  

Start 1/22/21 at 06:45;  Stop 1/23/21 at 06:44


Dexamethasone Sodium Phosphate (Decadron) 4 mg STK-MED ONCE .ROUTE ;  Start 

1/22/21 at 06:50;  Stop 1/22/21 at 06:50;  Status DC


Propofol (Diprivan) 200 mg STK-MED ONCE IV ;  Start 1/22/21 at 06:50;  Stop 

1/22/21 at 06:50;  Status DC


Lidocaine HCl (Lidocaine Pf 2% Vial) 5 ml STK-MED ONCE .ROUTE ;  Start 1/22/21 

at 06:50;  Stop 1/22/21 at 06:50;  Status DC


Ondansetron HCl (Zofran) 4 mg STK-MED ONCE .ROUTE ;  Start 1/22/21 at 06:50;  

Stop 1/22/21 at 06:50;  Status DC


Rocuronium Bromide (Zemuron) 50 mg STK-MED ONCE .ROUTE ;  Start 1/22/21 at 

06:51;  Stop 1/22/21 at 06:51;  Status DC





Active Scripts


Active


Cyclobenzaprine Hcl 10 Mg Tablet 1 Tab PO TID


Norco 5-325 Tablet (Acetaminophen/Hydrocodone Bitart) 1 Each Tablet 1 Tab PO PRN

Q6HRS PRN


Novolog Flexpen (Insulin Aspart) 100 Unit/1 Ml Insuln.pen 18 Unit SQ BEFORE 

DINNER


Norco 5-325 Tablet (Acetaminophen/Hydrocodone Bitart) 1 Each Tablet 1 Tab PO BID


Pantoprazole Sodium  ** (Pantoprazole Sodium) 40 Mg Tablet.dr 40 Mg PO DAILYAC


Metoprolol Tartrate 25 Mg Tablet 25 Mg PO BID


Lisinopril 5 Mg Tablet 5 Mg PO DAILY


Levemir Flextouch (Insulin Detemir) 100 Unit/1 Ml Insuln.pen 50 Units SQ QHS


Reported


Aspirin Buffered 325 Mg Tab (Aspirin/Calcium Carbonate/Mag) 325 Mg Tablet 325 Mg

PO DAILY


Metformin Hcl 1,000 Mg Tablet 1,000 Mg PO DAILYWBKFT


Simvastatin 40 Mg Tablet 40 Mg PO DAILY





Allergies


Allergies:  


Coded Allergies:  


     No Known Drug Allergies (Unverified , 5/24/17)





ROS


Gastrointestinal:  Yes Nausea, Yes Abdominal Pain





Physical Exam


General:  Alert, Oriented X3, Cooperative, mild distress


HEENT:  Atraumatic, EOMI


Lungs:  Clear to auscultation, Normal air movement


Heart:  Regular rate, No murmurs


Abdomen:  Normal bowel sounds, Soft, Other (Tender to palpation right upper 

quadrant)


Extremities:  No edema


Skin:  No significant lesion


Neuro:  Normal speech


Psych/Mental Status:  Mental status NL





Vitals


VITALS





Vital Signs








  Date Time  Temp Pulse Resp B/P (MAP) Pulse Ox O2 Delivery O2 Flow Rate FiO2


 


1/22/21 06:19    152/63    


 


1/22/21 03:00 100.1 73 24  92 Room Air  





 100.1       











Labs


Labs





Laboratory Tests








Test


 1/21/21


10:50 1/21/21


10:54 1/21/21


11:40 1/21/21


14:00


 


Urine Collection Type Unknown    


 


Urine Color Juanita    


 


Urine Clarity Clear    


 


Urine pH 5.5 (<5.0-8.0)    


 


Urine Specific Gravity


 1.025


(1.000-1.030) 


 


 





 


Urine Protein


 >=300 mg/dL


(NEG-TRACE) 


 


 





 


Urine Glucose (UA)


 100 mg/dL


(NEG) 


 


 





 


Urine Ketones (Stick)


 Negative mg/dL


(NEG) 


 


 





 


Urine Blood Trace (NEG)    


 


Urine Nitrite Negative (NEG)    


 


Urine Bilirubin Small (NEG)    


 


Urine Urobilinogen Dipstick


 1.0 mg/dL (0.2


mg/dL) 


 


 





 


Urine Leukocyte Esterase Negative (NEG)    


 


Urine RBC 0 /HPF (0-2)    


 


Urine WBC 1-4 /HPF (0-4)    


 


Urine Squamous Epithelial


Cells Few /LPF 


 


 


 





 


Urine Amorphous Sediment Present /HPF    


 


Urine Bacteria 0 /HPF (0-FEW)    


 


Urine Hyaline Casts Few /HPF    


 


Urine Granular Casts Few /HPF    


 


Glucose (Fingerstick)


 


 193 mg/dL


(70-99) 


 





 


White Blood Count


 


 


 10.8 x10^3/uL


(4.0-11.0) 





 


Red Blood Count


 


 


 4.01 x10^6/uL


(4.30-5.70) 





 


Hemoglobin


 


 


 12.4 g/dL


(13.0-17.5) 





 


Hematocrit


 


 


 36.4 %


(39.0-53.0) 





 


Mean Corpuscular Volume   91 fL ()  


 


Mean Corpuscular Hemoglobin   31 pg (25-35)  


 


Mean Corpuscular Hemoglobin


Concent 


 


 34 g/dL


(31-37) 





 


Red Cell Distribution Width


 


 


 14.1 %


(11.5-14.5) 





 


Platelet Count


 


 


 228 x10^3/uL


(140-400) 





 


Neutrophils (%) (Auto)   79 % (31-73)  


 


Lymphocytes (%) (Auto)   10 % (24-48)  


 


Monocytes (%) (Auto)   10 % (0-9)  


 


Eosinophils (%) (Auto)   1 % (0-3)  


 


Basophils (%) (Auto)   0 % (0-3)  


 


Neutrophils # (Auto)


 


 


 8.5 x10^3/uL


(1.8-7.7) 





 


Lymphocytes # (Auto)


 


 


 1.1 x10^3/uL


(1.0-4.8) 





 


Monocytes # (Auto)


 


 


 1.1 x10^3/uL


(0.0-1.1) 





 


Eosinophils # (Auto)


 


 


 0.1 x10^3/uL


(0.0-0.7) 





 


Basophils # (Auto)


 


 


 0.0 x10^3/uL


(0.0-0.2) 





 


Sodium Level


 


 


 134 mmol/L


(136-145) 





 


Potassium Level


 


 


 2.9 mmol/L


(3.5-5.1) 





 


Chloride Level


 


 


 97 mmol/L


() 





 


Carbon Dioxide Level


 


 


 26 mmol/L


(21-32) 





 


Anion Gap   11 (6-14)  


 


Blood Urea Nitrogen


 


 


 23 mg/dL


(8-26) 





 


Creatinine


 


 


 1.8 mg/dL


(0.7-1.3) 





 


Estimated GFR


(Cockcroft-Gault) 


 


 36.8 


 





 


BUN/Creatinine Ratio   13 (6-20)  


 


Glucose Level


 


 


 191 mg/dL


(70-99) 





 


Calcium Level


 


 


 8.6 mg/dL


(8.5-10.1) 





 


Magnesium Level


 


 


 1.9 mg/dL


(1.8-2.4) 





 


Total Bilirubin


 


 


 1.2 mg/dL


(0.2-1.0) 





 


Aspartate Amino Transf


(AST/SGOT) 


 


 100 U/L


(15-37) 





 


Alanine Aminotransferase


(ALT/SGPT) 


 


 154 U/L


(16-63) 





 


Alkaline Phosphatase


 


 


 221 U/L


() 





 


Troponin I Quantitative


 


 


 0.022 ng/mL


(0.000-0.055) 0.022 ng/mL


(0.000-0.055)


 


NT-Pro-B-Type Natriuretic


Peptide 


 


 998 pg/mL


(0-449) 





 


Total Protein


 


 


 7.3 g/dL


(6.4-8.2) 





 


Albumin


 


 


 2.5 g/dL


(3.4-5.0) 





 


Albumin/Globulin Ratio   0.5 (1.0-1.7)  


 


Lipase


 


 


 69 U/L


() 





 


Test


 1/21/21


15:08 1/21/21


18:43 1/21/21


19:30 1/21/21


23:55


 


SARS-CoV-2 Antigen (Rapid)


 Negative


(NEGATIVE) 


 


 





 


Glucose (Fingerstick)


 


 217 mg/dL


(70-99) 


 121 mg/dL


(70-99)


 


Prothrombin Time


 


 


 14.4 SEC


(11.7-14.0) 





 


Prothromb Time International


Ratio 


 


 1.2 (0.8-1.1) 


 





 


Sodium Level


 


 


 139 mmol/L


(136-145) 





 


Potassium Level


 


 


 3.2 mmol/L


(3.5-5.1) 





 


Chloride Level


 


 


 101 mmol/L


() 





 


Carbon Dioxide Level


 


 


 25 mmol/L


(21-32) 





 


Anion Gap   13 (6-14)  


 


Blood Urea Nitrogen


 


 


 20 mg/dL


(8-26) 





 


Creatinine


 


 


 1.6 mg/dL


(0.7-1.3) 





 


Estimated GFR


(Cockcroft-Gault) 


 


 42.1 


 





 


Glucose Level


 


 


 201 mg/dL


(70-99) 





 


Calcium Level


 


 


 7.8 mg/dL


(8.5-10.1) 





 


Troponin I Quantitative


 


 


 0.033 ng/mL


(0.000-0.055) 





 


Test


 1/22/21


04:30 


 


 





 


White Blood Count


 7.7 x10^3/uL


(4.0-11.0) 


 


 





 


Red Blood Count


 3.66 x10^6/uL


(4.30-5.70) 


 


 





 


Hemoglobin


 11.6 g/dL


(13.0-17.5) 


 


 





 


Hematocrit


 33.2 %


(39.0-53.0) 


 


 





 


Mean Corpuscular Volume 91 fL ()    


 


Mean Corpuscular Hemoglobin 32 pg (25-35)    


 


Mean Corpuscular Hemoglobin


Concent 35 g/dL


(31-37) 


 


 





 


Red Cell Distribution Width


 14.0 %


(11.5-14.5) 


 


 





 


Platelet Count


 223 x10^3/uL


(140-400) 


 


 





 


Neutrophils (%) (Auto) 72 % (31-73)    


 


Lymphocytes (%) (Auto) 14 % (24-48)    


 


Monocytes (%) (Auto) 12 % (0-9)    


 


Eosinophils (%) (Auto) 1 % (0-3)    


 


Basophils (%) (Auto) 0 % (0-3)    


 


Neutrophils # (Auto)


 5.5 x10^3/uL


(1.8-7.7) 


 


 





 


Lymphocytes # (Auto)


 1.1 x10^3/uL


(1.0-4.8) 


 


 





 


Monocytes # (Auto)


 0.9 x10^3/uL


(0.0-1.1) 


 


 





 


Eosinophils # (Auto)


 0.1 x10^3/uL


(0.0-0.7) 


 


 





 


Basophils # (Auto)


 0.0 x10^3/uL


(0.0-0.2) 


 


 





 


Sodium Level


 141 mmol/L


(136-145) 


 


 





 


Potassium Level


 3.3 mmol/L


(3.5-5.1) 


 


 





 


Chloride Level


 105 mmol/L


() 


 


 





 


Carbon Dioxide Level


 25 mmol/L


(21-32) 


 


 





 


Anion Gap 11 (6-14)    


 


Blood Urea Nitrogen


 16 mg/dL


(8-26) 


 


 





 


Creatinine


 1.5 mg/dL


(0.7-1.3) 


 


 





 


Estimated GFR


(Cockcroft-Gault) 45.4 


 


 


 





 


BUN/Creatinine Ratio 11 (6-20)    


 


Glucose Level


 89 mg/dL


(70-99) 


 


 





 


Calcium Level


 7.4 mg/dL


(8.5-10.1) 


 


 





 


Total Bilirubin


 0.8 mg/dL


(0.2-1.0) 


 


 





 


Aspartate Amino Transf


(AST/SGOT) 95 U/L (15-37) 


 


 


 





 


Alanine Aminotransferase


(ALT/SGPT) 136 U/L


(16-63) 


 


 





 


Alkaline Phosphatase


 268 U/L


() 


 


 





 


Troponin I Quantitative


 0.046 ng/mL


(0.000-0.055) 


 


 





 


Total Protein


 5.8 g/dL


(6.4-8.2) 


 


 





 


Albumin


 2.2 g/dL


(3.4-5.0) 


 


 





 


Albumin/Globulin Ratio 0.6 (1.0-1.7)    


 


Triglycerides Level


 184 mg/dL


(0-150) 


 


 





 


Cholesterol Level


 124 mg/dL


(0-200) 


 


 





 


LDL Cholesterol, Calculated


 76 mg/dL


(0-100) 


 


 





 


VLDL Cholesterol, Calculated


 37 mg/dL


(0-40) 


 


 





 


Non-HDL Cholesterol Calculated


 113 mg/dL


(0-129) 


 


 





 


HDL Cholesterol


 11 mg/dL


(40-60) 


 


 





 


Cholesterol/HDL Ratio 11.3    








Laboratory Tests








Test


 1/21/21


10:50 1/21/21


10:54 1/21/21


11:40 1/21/21


14:00


 


Urine Collection Type Unknown    


 


Urine Color Juanita    


 


Urine Clarity Clear    


 


Urine pH 5.5 (<5.0-8.0)    


 


Urine Specific Gravity


 1.025


(1.000-1.030) 


 


 





 


Urine Protein


 >=300 mg/dL


(NEG-TRACE) 


 


 





 


Urine Glucose (UA)


 100 mg/dL


(NEG) 


 


 





 


Urine Ketones (Stick)


 Negative mg/dL


(NEG) 


 


 





 


Urine Blood Trace (NEG)    


 


Urine Nitrite Negative (NEG)    


 


Urine Bilirubin Small (NEG)    


 


Urine Urobilinogen Dipstick


 1.0 mg/dL (0.2


mg/dL) 


 


 





 


Urine Leukocyte Esterase Negative (NEG)    


 


Urine RBC 0 /HPF (0-2)    


 


Urine WBC 1-4 /HPF (0-4)    


 


Urine Squamous Epithelial


Cells Few /LPF 


 


 


 





 


Urine Amorphous Sediment Present /HPF    


 


Urine Bacteria 0 /HPF (0-FEW)    


 


Urine Hyaline Casts Few /HPF    


 


Urine Granular Casts Few /HPF    


 


Glucose (Fingerstick)


 


 193 mg/dL


(70-99) 


 





 


White Blood Count


 


 


 10.8 x10^3/uL


(4.0-11.0) 





 


Red Blood Count


 


 


 4.01 x10^6/uL


(4.30-5.70) 





 


Hemoglobin


 


 


 12.4 g/dL


(13.0-17.5) 





 


Hematocrit


 


 


 36.4 %


(39.0-53.0) 





 


Mean Corpuscular Volume   91 fL ()  


 


Mean Corpuscular Hemoglobin   31 pg (25-35)  


 


Mean Corpuscular Hemoglobin


Concent 


 


 34 g/dL


(31-37) 





 


Red Cell Distribution Width


 


 


 14.1 %


(11.5-14.5) 





 


Platelet Count


 


 


 228 x10^3/uL


(140-400) 





 


Neutrophils (%) (Auto)   79 % (31-73)  


 


Lymphocytes (%) (Auto)   10 % (24-48)  


 


Monocytes (%) (Auto)   10 % (0-9)  


 


Eosinophils (%) (Auto)   1 % (0-3)  


 


Basophils (%) (Auto)   0 % (0-3)  


 


Neutrophils # (Auto)


 


 


 8.5 x10^3/uL


(1.8-7.7) 





 


Lymphocytes # (Auto)


 


 


 1.1 x10^3/uL


(1.0-4.8) 





 


Monocytes # (Auto)


 


 


 1.1 x10^3/uL


(0.0-1.1) 





 


Eosinophils # (Auto)


 


 


 0.1 x10^3/uL


(0.0-0.7) 





 


Basophils # (Auto)


 


 


 0.0 x10^3/uL


(0.0-0.2) 





 


Sodium Level


 


 


 134 mmol/L


(136-145) 





 


Potassium Level


 


 


 2.9 mmol/L


(3.5-5.1) 





 


Chloride Level


 


 


 97 mmol/L


() 





 


Carbon Dioxide Level


 


 


 26 mmol/L


(21-32) 





 


Anion Gap   11 (6-14)  


 


Blood Urea Nitrogen


 


 


 23 mg/dL


(8-26) 





 


Creatinine


 


 


 1.8 mg/dL


(0.7-1.3) 





 


Estimated GFR


(Cockcroft-Gault) 


 


 36.8 


 





 


BUN/Creatinine Ratio   13 (6-20)  


 


Glucose Level


 


 


 191 mg/dL


(70-99) 





 


Calcium Level


 


 


 8.6 mg/dL


(8.5-10.1) 





 


Magnesium Level


 


 


 1.9 mg/dL


(1.8-2.4) 





 


Total Bilirubin


 


 


 1.2 mg/dL


(0.2-1.0) 





 


Aspartate Amino Transf


(AST/SGOT) 


 


 100 U/L


(15-37) 





 


Alanine Aminotransferase


(ALT/SGPT) 


 


 154 U/L


(16-63) 





 


Alkaline Phosphatase


 


 


 221 U/L


() 





 


Troponin I Quantitative


 


 


 0.022 ng/mL


(0.000-0.055) 0.022 ng/mL


(0.000-0.055)


 


NT-Pro-B-Type Natriuretic


Peptide 


 


 998 pg/mL


(0-449) 





 


Total Protein


 


 


 7.3 g/dL


(6.4-8.2) 





 


Albumin


 


 


 2.5 g/dL


(3.4-5.0) 





 


Albumin/Globulin Ratio   0.5 (1.0-1.7)  


 


Lipase


 


 


 69 U/L


() 





 


Test


 1/21/21


15:08 1/21/21


18:43 1/21/21


19:30 1/21/21


23:55


 


SARS-CoV-2 Antigen (Rapid)


 Negative


(NEGATIVE) 


 


 





 


Glucose (Fingerstick)


 


 217 mg/dL


(70-99) 


 121 mg/dL


(70-99)


 


Prothrombin Time


 


 


 14.4 SEC


(11.7-14.0) 





 


Prothromb Time International


Ratio 


 


 1.2 (0.8-1.1) 


 





 


Sodium Level


 


 


 139 mmol/L


(136-145) 





 


Potassium Level


 


 


 3.2 mmol/L


(3.5-5.1) 





 


Chloride Level


 


 


 101 mmol/L


() 





 


Carbon Dioxide Level


 


 


 25 mmol/L


(21-32) 





 


Anion Gap   13 (6-14)  


 


Blood Urea Nitrogen


 


 


 20 mg/dL


(8-26) 





 


Creatinine


 


 


 1.6 mg/dL


(0.7-1.3) 





 


Estimated GFR


(Cockcroft-Gault) 


 


 42.1 


 





 


Glucose Level


 


 


 201 mg/dL


(70-99) 





 


Calcium Level


 


 


 7.8 mg/dL


(8.5-10.1) 





 


Troponin I Quantitative


 


 


 0.033 ng/mL


(0.000-0.055) 





 


Test


 1/22/21


04:30 


 


 





 


White Blood Count


 7.7 x10^3/uL


(4.0-11.0) 


 


 





 


Red Blood Count


 3.66 x10^6/uL


(4.30-5.70) 


 


 





 


Hemoglobin


 11.6 g/dL


(13.0-17.5) 


 


 





 


Hematocrit


 33.2 %


(39.0-53.0) 


 


 





 


Mean Corpuscular Volume 91 fL ()    


 


Mean Corpuscular Hemoglobin 32 pg (25-35)    


 


Mean Corpuscular Hemoglobin


Concent 35 g/dL


(31-37) 


 


 





 


Red Cell Distribution Width


 14.0 %


(11.5-14.5) 


 


 





 


Platelet Count


 223 x10^3/uL


(140-400) 


 


 





 


Neutrophils (%) (Auto) 72 % (31-73)    


 


Lymphocytes (%) (Auto) 14 % (24-48)    


 


Monocytes (%) (Auto) 12 % (0-9)    


 


Eosinophils (%) (Auto) 1 % (0-3)    


 


Basophils (%) (Auto) 0 % (0-3)    


 


Neutrophils # (Auto)


 5.5 x10^3/uL


(1.8-7.7) 


 


 





 


Lymphocytes # (Auto)


 1.1 x10^3/uL


(1.0-4.8) 


 


 





 


Monocytes # (Auto)


 0.9 x10^3/uL


(0.0-1.1) 


 


 





 


Eosinophils # (Auto)


 0.1 x10^3/uL


(0.0-0.7) 


 


 





 


Basophils # (Auto)


 0.0 x10^3/uL


(0.0-0.2) 


 


 





 


Sodium Level


 141 mmol/L


(136-145) 


 


 





 


Potassium Level


 3.3 mmol/L


(3.5-5.1) 


 


 





 


Chloride Level


 105 mmol/L


() 


 


 





 


Carbon Dioxide Level


 25 mmol/L


(21-32) 


 


 





 


Anion Gap 11 (6-14)    


 


Blood Urea Nitrogen


 16 mg/dL


(8-26) 


 


 





 


Creatinine


 1.5 mg/dL


(0.7-1.3) 


 


 





 


Estimated GFR


(Cockcroft-Gault) 45.4 


 


 


 





 


BUN/Creatinine Ratio 11 (6-20)    


 


Glucose Level


 89 mg/dL


(70-99) 


 


 





 


Calcium Level


 7.4 mg/dL


(8.5-10.1) 


 


 





 


Total Bilirubin


 0.8 mg/dL


(0.2-1.0) 


 


 





 


Aspartate Amino Transf


(AST/SGOT) 95 U/L (15-37) 


 


 


 





 


Alanine Aminotransferase


(ALT/SGPT) 136 U/L


(16-63) 


 


 





 


Alkaline Phosphatase


 268 U/L


() 


 


 





 


Troponin I Quantitative


 0.046 ng/mL


(0.000-0.055) 


 


 





 


Total Protein


 5.8 g/dL


(6.4-8.2) 


 


 





 


Albumin


 2.2 g/dL


(3.4-5.0) 


 


 





 


Albumin/Globulin Ratio 0.6 (1.0-1.7)    


 


Triglycerides Level


 184 mg/dL


(0-150) 


 


 





 


Cholesterol Level


 124 mg/dL


(0-200) 


 


 





 


LDL Cholesterol, Calculated


 76 mg/dL


(0-100) 


 


 





 


VLDL Cholesterol, Calculated


 37 mg/dL


(0-40) 


 


 





 


Non-HDL Cholesterol Calculated


 113 mg/dL


(0-129) 


 


 





 


HDL Cholesterol


 11 mg/dL


(40-60) 


 


 





 


Cholesterol/HDL Ratio 11.3    











Assessment/Plan


Assessment/Plan


Acute cholecystitis plan laparoscopic cholecystectomy.  Procedure was explained 

to the patient risk benefits were also discussed patient seemed understand gave 

a verbal written consent to have the procedure performed











KAREL GLASS MD             Jan 22, 2021 07:00

## 2021-01-22 NOTE — PDOC
CARDIO Progress Notes


Date and Time


Date of Service


1/22/2021


Time of Evaluation


1330





Subjective


Subjective:  No Chest Pain, No shortness of breath, No Palpitations





Vitals


Vitals





Vital Signs








  Date Time  Temp Pulse Resp B/P (MAP) Pulse Ox O2 Delivery O2 Flow Rate FiO2


 


1/22/21 11:47  72  134/63    


 


1/22/21 11:00 99.2  18  93 Room Air  





 99.2       


 


1/22/21 08:49       10 








Weight


Weight [ ]





Input and Output


Intake and Output











Intake and Output 


 


 1/22/21





 07:00


 


Intake Total 0 ml


 


Output Total 600 ml


 


Balance -600 ml


 


 


 


Intake Oral 0 ml


 


Output Urine Total 600 ml











Laboratory


Labs





Laboratory Tests








Test


 1/21/21


15:08 1/21/21


18:43 1/21/21


19:30 1/21/21


23:55


 


SARS-CoV-2 Antigen (Rapid)


 Negative


(NEGATIVE) 


 


 





 


Glucose (Fingerstick)


 


 217 mg/dL


(70-99) 


 121 mg/dL


(70-99)


 


Prothrombin Time


 


 


 14.4 SEC


(11.7-14.0) 





 


Prothromb Time International


Ratio 


 


 1.2 (0.8-1.1) 


 





 


Sodium Level


 


 


 139 mmol/L


(136-145) 





 


Potassium Level


 


 


 3.2 mmol/L


(3.5-5.1) 





 


Chloride Level


 


 


 101 mmol/L


() 





 


Carbon Dioxide Level


 


 


 25 mmol/L


(21-32) 





 


Anion Gap   13 (6-14)  


 


Blood Urea Nitrogen


 


 


 20 mg/dL


(8-26) 





 


Creatinine


 


 


 1.6 mg/dL


(0.7-1.3) 





 


Estimated GFR


(Cockcroft-Gault) 


 


 42.1 


 





 


Glucose Level


 


 


 201 mg/dL


(70-99) 





 


Calcium Level


 


 


 7.8 mg/dL


(8.5-10.1) 





 


Troponin I Quantitative


 


 


 0.033 ng/mL


(0.000-0.055) 





 


Test


 1/22/21


04:30 1/22/21


08:39 1/22/21


10:54 





 


White Blood Count


 7.7 x10^3/uL


(4.0-11.0) 


 


 





 


Red Blood Count


 3.66 x10^6/uL


(4.30-5.70) 


 


 





 


Hemoglobin


 11.6 g/dL


(13.0-17.5) 


 


 





 


Hematocrit


 33.2 %


(39.0-53.0) 


 


 





 


Mean Corpuscular Volume 91 fL ()    


 


Mean Corpuscular Hemoglobin 32 pg (25-35)    


 


Mean Corpuscular Hemoglobin


Concent 35 g/dL


(31-37) 


 


 





 


Red Cell Distribution Width


 14.0 %


(11.5-14.5) 


 


 





 


Platelet Count


 223 x10^3/uL


(140-400) 


 


 





 


Neutrophils (%) (Auto) 72 % (31-73)    


 


Lymphocytes (%) (Auto) 14 % (24-48)    


 


Monocytes (%) (Auto) 12 % (0-9)    


 


Eosinophils (%) (Auto) 1 % (0-3)    


 


Basophils (%) (Auto) 0 % (0-3)    


 


Neutrophils # (Auto)


 5.5 x10^3/uL


(1.8-7.7) 


 


 





 


Lymphocytes # (Auto)


 1.1 x10^3/uL


(1.0-4.8) 


 


 





 


Monocytes # (Auto)


 0.9 x10^3/uL


(0.0-1.1) 


 


 





 


Eosinophils # (Auto)


 0.1 x10^3/uL


(0.0-0.7) 


 


 





 


Basophils # (Auto)


 0.0 x10^3/uL


(0.0-0.2) 


 


 





 


Sodium Level


 141 mmol/L


(136-145) 


 


 





 


Potassium Level


 3.3 mmol/L


(3.5-5.1) 


 


 





 


Chloride Level


 105 mmol/L


() 


 


 





 


Carbon Dioxide Level


 25 mmol/L


(21-32) 


 


 





 


Anion Gap 11 (6-14)    


 


Blood Urea Nitrogen


 16 mg/dL


(8-26) 


 


 





 


Creatinine


 1.5 mg/dL


(0.7-1.3) 


 


 





 


Estimated GFR


(Cockcroft-Gault) 45.4 


 


 


 





 


BUN/Creatinine Ratio 11 (6-20)    


 


Glucose Level


 89 mg/dL


(70-99) 


 


 





 


Calcium Level


 7.4 mg/dL


(8.5-10.1) 


 


 





 


Total Bilirubin


 0.8 mg/dL


(0.2-1.0) 


 


 





 


Aspartate Amino Transf


(AST/SGOT) 95 U/L (15-37) 


 


 


 





 


Alanine Aminotransferase


(ALT/SGPT) 136 U/L


(16-63) 


 


 





 


Alkaline Phosphatase


 268 U/L


() 


 


 





 


Troponin I Quantitative


 0.046 ng/mL


(0.000-0.055) 


 


 





 


Total Protein


 5.8 g/dL


(6.4-8.2) 


 


 





 


Albumin


 2.2 g/dL


(3.4-5.0) 


 


 





 


Albumin/Globulin Ratio 0.6 (1.0-1.7)    


 


Triglycerides Level


 184 mg/dL


(0-150) 


 


 





 


Cholesterol Level


 124 mg/dL


(0-200) 


 


 





 


LDL Cholesterol, Calculated


 76 mg/dL


(0-100) 


 


 





 


VLDL Cholesterol, Calculated


 37 mg/dL


(0-40) 


 


 





 


Non-HDL Cholesterol Calculated


 113 mg/dL


(0-129) 


 


 





 


HDL Cholesterol


 11 mg/dL


(40-60) 


 


 





 


Cholesterol/HDL Ratio 11.3    


 


Glucose (Fingerstick)


 


 107 mg/dL


(70-99) 159 mg/dL


(70-99) 














Physical Exam


Chest:  Symmetric


LUNGS:  Other (diminished bases)


Heart:  RRR (SR)


Abdomen:  Other (S/P Lap arelis today)


Extremities:  No Calf Tenderness


Neurology:  alert, oriented, follow commands





Assessment


Assessment


1. Atypical chest pain: from acute cholecystitis. S/P Lap arelis today tolerated 

procedure well


2. CAD; CABGx4 in 2014, clinically stable. Trops nml EKG SR without acute 

changes


3. JOSSE on CKD2: baseline Cr at 1.2-1.5. back to baseline


4. HTN: controlled


5. HLP with hypertriglyceridemia


6. DM2


7. Transaminitis


8. Hypokalemia


9. PUI


10. Hypoxia: 88-90% RA possibly from atelectasis





Recommendations


1. IV lopresor currently. Discussed with RN if pt is tolerating advanced diet 

then could switch to PO lopressor. 


2. Discussed with RN, pulmonary toiletting and aggressive IS


3. IVF while NPO. No NSAIDs  Hold home ACEi. 


4. Replace K


5. Will follow peripherally.





Justicifation of Admission Dx:


Justifications for Admission:


Justification of Admission Dx:  Yes











DHRUV MAS APRN          Jan 22, 2021 14:51

## 2021-01-23 VITALS — SYSTOLIC BLOOD PRESSURE: 136 MMHG | DIASTOLIC BLOOD PRESSURE: 61 MMHG

## 2021-01-23 VITALS — SYSTOLIC BLOOD PRESSURE: 156 MMHG | DIASTOLIC BLOOD PRESSURE: 69 MMHG

## 2021-01-23 VITALS — DIASTOLIC BLOOD PRESSURE: 56 MMHG | SYSTOLIC BLOOD PRESSURE: 121 MMHG

## 2021-01-23 VITALS — DIASTOLIC BLOOD PRESSURE: 57 MMHG | SYSTOLIC BLOOD PRESSURE: 132 MMHG

## 2021-01-23 VITALS — SYSTOLIC BLOOD PRESSURE: 138 MMHG | DIASTOLIC BLOOD PRESSURE: 65 MMHG

## 2021-01-23 VITALS — DIASTOLIC BLOOD PRESSURE: 54 MMHG | SYSTOLIC BLOOD PRESSURE: 137 MMHG

## 2021-01-23 LAB
ANION GAP SERPL CALC-SCNC: 11 MMOL/L (ref 6–14)
BASOPHILS # BLD AUTO: 0 X10^3/UL (ref 0–0.2)
BASOPHILS NFR BLD: 0 % (ref 0–3)
BUN SERPL-MCNC: 23 MG/DL (ref 8–26)
CALCIUM SERPL-MCNC: 7.6 MG/DL (ref 8.5–10.1)
CHLORIDE SERPL-SCNC: 105 MMOL/L (ref 98–107)
CO2 SERPL-SCNC: 22 MMOL/L (ref 21–32)
CREAT SERPL-MCNC: 1.8 MG/DL (ref 0.7–1.3)
EOSINOPHIL NFR BLD: 0 % (ref 0–3)
EOSINOPHIL NFR BLD: 0 X10^3/UL (ref 0–0.7)
ERYTHROCYTE [DISTWIDTH] IN BLOOD BY AUTOMATED COUNT: 14.7 % (ref 11.5–14.5)
GFR SERPLBLD BASED ON 1.73 SQ M-ARVRAT: 36.8 ML/MIN
GLUCOSE SERPL-MCNC: 326 MG/DL (ref 70–99)
HCT VFR BLD CALC: 32.5 % (ref 39–53)
HGB BLD-MCNC: 10.8 G/DL (ref 13–17.5)
LYMPHOCYTES # BLD: 0.9 X10^3/UL (ref 1–4.8)
LYMPHOCYTES NFR BLD AUTO: 10 % (ref 24–48)
MCH RBC QN AUTO: 31 PG (ref 25–35)
MCHC RBC AUTO-ENTMCNC: 33 G/DL (ref 31–37)
MCV RBC AUTO: 93 FL (ref 79–100)
MONO #: 0.7 X10^3/UL (ref 0–1.1)
MONOCYTES NFR BLD: 8 % (ref 0–9)
NEUT #: 7.5 X10^3/UL (ref 1.8–7.7)
NEUTROPHILS NFR BLD AUTO: 82 % (ref 31–73)
PLATELET # BLD AUTO: 250 X10^3/UL (ref 140–400)
POTASSIUM SERPL-SCNC: 4.3 MMOL/L (ref 3.5–5.1)
RBC # BLD AUTO: 3.5 X10^6/UL (ref 4.3–5.7)
SODIUM SERPL-SCNC: 138 MMOL/L (ref 136–145)
WBC # BLD AUTO: 9.2 X10^3/UL (ref 4–11)

## 2021-01-23 RX ADMIN — Medication SCH CAP: at 20:19

## 2021-01-23 RX ADMIN — INSULIN LISPRO SCH UNITS: 100 INJECTION, SOLUTION INTRAVENOUS; SUBCUTANEOUS at 09:15

## 2021-01-23 RX ADMIN — ASPIRIN SCH MG: 81 TABLET, COATED ORAL at 09:13

## 2021-01-23 RX ADMIN — BACITRACIN SCH MLS/HR: 5000 INJECTION, POWDER, FOR SOLUTION INTRAMUSCULAR at 22:52

## 2021-01-23 RX ADMIN — PIPERACILLIN SODIUM AND TAZOBACTAM SODIUM SCH MLS/HR: 2; .25 INJECTION, POWDER, LYOPHILIZED, FOR SOLUTION INTRAVENOUS at 17:39

## 2021-01-23 RX ADMIN — METOPROLOL TARTRATE SCH MG: 25 TABLET ORAL at 20:20

## 2021-01-23 RX ADMIN — OXYCODONE HYDROCHLORIDE AND ACETAMINOPHEN PRN TAB: 5; 325 TABLET ORAL at 18:15

## 2021-01-23 RX ADMIN — METOPROLOL TARTRATE SCH MG: 25 TABLET ORAL at 09:14

## 2021-01-23 RX ADMIN — BACITRACIN SCH MLS/HR: 5000 INJECTION, POWDER, FOR SOLUTION INTRAMUSCULAR at 09:17

## 2021-01-23 RX ADMIN — INSULIN LISPRO SCH UNITS: 100 INJECTION, SOLUTION INTRAVENOUS; SUBCUTANEOUS at 12:05

## 2021-01-23 RX ADMIN — PIPERACILLIN SODIUM AND TAZOBACTAM SODIUM SCH MLS/HR: 2; .25 INJECTION, POWDER, LYOPHILIZED, FOR SOLUTION INTRAVENOUS at 11:46

## 2021-01-23 RX ADMIN — OXYCODONE HYDROCHLORIDE AND ACETAMINOPHEN PRN TAB: 5; 325 TABLET ORAL at 10:01

## 2021-01-23 RX ADMIN — SIMVASTATIN SCH MG: 40 TABLET, FILM COATED ORAL at 09:13

## 2021-01-23 RX ADMIN — Medication SCH CAP: at 09:13

## 2021-01-23 RX ADMIN — PIPERACILLIN SODIUM AND TAZOBACTAM SODIUM SCH MLS/HR: 2; .25 INJECTION, POWDER, LYOPHILIZED, FOR SOLUTION INTRAVENOUS at 05:28

## 2021-01-23 RX ADMIN — INSULIN LISPRO SCH UNITS: 100 INJECTION, SOLUTION INTRAVENOUS; SUBCUTANEOUS at 20:27

## 2021-01-23 RX ADMIN — OXYCODONE HYDROCHLORIDE AND ACETAMINOPHEN PRN TAB: 5; 325 TABLET ORAL at 14:07

## 2021-01-23 RX ADMIN — INSULIN LISPRO SCH UNITS: 100 INJECTION, SOLUTION INTRAVENOUS; SUBCUTANEOUS at 17:39

## 2021-01-23 RX ADMIN — PIPERACILLIN SODIUM AND TAZOBACTAM SODIUM SCH MLS/HR: 2; .25 INJECTION, POWDER, LYOPHILIZED, FOR SOLUTION INTRAVENOUS at 00:53

## 2021-01-23 RX ADMIN — PIPERACILLIN SODIUM AND TAZOBACTAM SODIUM SCH MLS/HR: 2; .25 INJECTION, POWDER, LYOPHILIZED, FOR SOLUTION INTRAVENOUS at 22:51

## 2021-01-23 NOTE — PDOC
[facility] PROGRESS NOTE


Date of Service


DOS:


DATE: 1/23/21 


TIME: 20:17





Chief Complaint


Chief Complaint


A/P:


Epigastric abdominal pain - seems to be acute Cholecystitis, but concerning for 

possible prior findings. May also be 


Hypokalemia - will replace, check mag


JOSSE - vasomotor nephropathy, will hydrate gently, monitor renal function


Chest pain - likely GERD vs gastritis vs PUD, however with his CAD history will 

trend troponins, ask cardiology to see


Transaminitis - likely from gallbladder disease vs possible COVID 19, though he 

denies exposure


Elevated troponin - likely demand ischemia, will trend. cardiology consulted


DM2 - with hyperglycemia - will keep Npo, sliding scale


Anemia - likely of chronic disease


CAD s/p CABG x4 2016 - has outpatient f/u, taking meds


Abnormal CXR - will treat as pneumonia, likely gram negative given his age and 

risk factors. Will f/u COVID 19 results





FEN - NPO


PPX - SCDs


FULL CODE


Dispo - inpatient for above





History of Present Illness


History of Present Illness


Mr Dhaliwal is a 75  year old male with history of hypertension, diabetes, 

coronary disease status post CABG x4 who presents to the ED with his daughter 

with epigastric pain and chest pain as well as right flank pain.


Pain is epigastric and substernal, sharp, intermittent, 7/10 with occasional 

radiation to right flank, though it is not currently radiating. Food does not 

affect his pain. No recent sick contacts. He does have some shortness of breath 

that is intermittent. No cough. Occasional chills.


He was seen in the emergency department on Saturday, January 16 for flank pain. 

Nothing makes it better or worse.  He reports that he had a decrease in appetite

.  He said some nausea without vomiting.  Patient denies diarrhea, no fevers, no

syncope headache neck pain.  He has had some intermittent chills. Patient is not

a smoker.  Denies daily drugs or alcohol.  Patient is accompanied by family 

member who helps with history.


EKG appears normal sinus rhythm with ventricular rate of 74 bpm.  ME interval 

194 ms.  QRS duration 96 ms.  QTc 460 ms.  PAC noted.  No acute ST segment 

elevations.


Chest radiograph with suspected focal right upper lobe interstitial infiltrate 

or scarring.


RUQ US with debris within the gallbladder with gallbladder wall thickening and 

pericholecystic edema. Echogenic liver.


Labs significant for WBC 10.8 with left shift, Hb 12.4, platelets 228, , 

K2.9, BUN 23, CR 1.8, glucose 191, albumin 2.5, alk phos 221, , , 

bilirubin 1.2, lipase 69, , troponin 0 0.022 x 2


Admitted for further care.








1/22: Patient seen and evaluated, s/p laparoscopic cholecystectomy.  Patient was

febrile overnight.  Reports some incisional tenderness.  Chest x-ray on 

admission showed suspected focal right upper lobe interstitial infiltrate or 

scarring.  COVID-19  and influenza pending.


1/23: Patient flu and COVID-19 negative.  S/p laparoscopic cholecystectomy, 

cleared by general surgery to discharge. And follow-up with Dr. Anguiano in 2 

weeks.  Patient became hypoxic this afternoon, requiring 1-2 L nasal cannula.  

Suspect postsurgical atelectasis.  Will obtain CXR.  If still hypoxic tomorrow 

morning will obtain 6-minute walk to evaluate for O2.





Vitals/I&O


Vitals/I&O:





                                   Vital Signs








  Date Time  Temp Pulse Resp B/P (MAP) Pulse Ox O2 Delivery O2 Flow Rate FiO2


 


1/23/21 18:15   17   Nasal Cannula 2.0 


 


1/23/21 15:00 97.3 65  132/57 (82) 92   





 97.3       














                                    I & O   


 


 1/22/21 1/22/21 1/23/21





 15:00 23:00 07:00


 


Intake Total 1250 ml  240 ml


 


Output Total 210 ml 100 ml 450 ml


 


Balance 1040 ml -100 ml -210 ml











Physical Exam


General:  Alert, Oriented X3, Cooperative, mild distress


Heart:  Regular rate, No murmurs


Lungs:  Clear


Abdomen:  Normal bowel sounds, Soft, Other (Mild incisional tenderness wounds 

clean dry and intact)


Extremities:  No edema


Skin:  No significant lesion





Labs


Labs:





Laboratory Tests








Test


 1/22/21


20:36 1/23/21


03:30 1/23/21


11:54 1/23/21


14:51


 


Glucose (Fingerstick)


 303 mg/dL


(70-99) 


 287 mg/dL


(70-99) 258 mg/dL


(70-99)


 


White Blood Count


 


 9.2 x10^3/uL


(4.0-11.0) 


 





 


Red Blood Count


 


 3.50 x10^6/uL


(4.30-5.70) 


 





 


Hemoglobin


 


 10.8 g/dL


(13.0-17.5) 


 





 


Hematocrit


 


 32.5 %


(39.0-53.0) 


 





 


Mean Corpuscular Volume  93 fL ()   


 


Mean Corpuscular Hemoglobin  31 pg (25-35)   


 


Mean Corpuscular Hemoglobin


Concent 


 33 g/dL


(31-37) 


 





 


Red Cell Distribution Width


 


 14.7 %


(11.5-14.5) 


 





 


Platelet Count


 


 250 x10^3/uL


(140-400) 


 





 


Neutrophils (%) (Auto)  82 % (31-73)   


 


Lymphocytes (%) (Auto)  10 % (24-48)   


 


Monocytes (%) (Auto)  8 % (0-9)   


 


Eosinophils (%) (Auto)  0 % (0-3)   


 


Basophils (%) (Auto)  0 % (0-3)   


 


Neutrophils # (Auto)


 


 7.5 x10^3/uL


(1.8-7.7) 


 





 


Lymphocytes # (Auto)


 


 0.9 x10^3/uL


(1.0-4.8) 


 





 


Monocytes # (Auto)


 


 0.7 x10^3/uL


(0.0-1.1) 


 





 


Eosinophils # (Auto)


 


 0.0 x10^3/uL


(0.0-0.7) 


 





 


Basophils # (Auto)


 


 0.0 x10^3/uL


(0.0-0.2) 


 





 


Sodium Level


 


 138 mmol/L


(136-145) 


 





 


Potassium Level


 


 4.3 mmol/L


(3.5-5.1) 


 





 


Chloride Level


 


 105 mmol/L


() 


 





 


Carbon Dioxide Level


 


 22 mmol/L


(21-32) 


 





 


Anion Gap  11 (6-14)   


 


Blood Urea Nitrogen


 


 23 mg/dL


(8-26) 


 





 


Creatinine


 


 1.8 mg/dL


(0.7-1.3) 


 





 


Estimated GFR


(Cockcroft-Gault) 


 36.8 


 


 





 


Glucose Level


 


 326 mg/dL


(70-99) 


 





 


Calcium Level


 


 7.6 mg/dL


(8.5-10.1) 


 





 


Test


 1/23/21


16:41 


 


 





 


Glucose (Fingerstick)


 372 mg/dL


(70-99) 


 


 














Assessment and Plan


Assessmemt and Plan


Problems


Medical Problems:


(1) Abdominal pain


Status: Acute  





(2) Cholecystitis


Status: Acute  





(3) Hypokalemia


Status: Acute  





(4) Transaminitis


Status: Acute  











Comment


Review of Relevant


I have reviewed the following items fernando (where applicable) has been applied.


Medications:





Current Medications








 Medications


  (Trade)  Dose


 Ordered  Sig/Jillian


 Route


 PRN Reason  Start Time


 Stop Time Status Last Admin


Dose Admin


 


 Lactobacillus


 Rhamnosus


  (Culturelle)  1 cap  BID


 PO


   1/22/21 21:00


    1/23/21 09:13





 


 Metoprolol


 Tartrate


  (Lopressor)  25 mg  BID


 PO


   1/22/21 21:00


    1/23/21 09:14





 


 Simvastatin


  (Zocor)  40 mg  DAILY


 PO


   1/23/21 09:00


    1/23/21 09:13





 


 Aspirin


  (Ecotrin)  81 mg  [id number]


 PO


   1/23/21 08:00


    1/23/21 09:13





 


 Insulin Human


 Lispro


  (HumaLOG)  0-9 UNITS  QIDACHS


 SQ


   1/22/21 21:00


    1/23/21 17:39














Justifications for Admission


Other Justification














DERIC WALL MD            Jan 23, 2021 20:20

## 2021-01-23 NOTE — PDOC
SURGICAL PROGRESS NOTE


DATE: 1/23/21 


TIME: 07:58


Subjective


Patient doing quite well tolerating diet minimal abdominal pain


Vital Signs





Vital Signs








  Date Time  Temp Pulse Resp B/P (MAP) Pulse Ox O2 Delivery O2 Flow Rate FiO2


 


1/23/21 03:57 97.7 61 16 121/56 (77) 95 Nasal Cannula 1.0 





 97.7       








I&O











Intake and Output 


 


 1/23/21





 07:00


 


Intake Total 1490 ml


 


Output Total 760 ml


 


Balance 730 ml


 


 


 


Intake Oral 540 ml


 


IV Total 950 ml


 


Output Urine Total 750 ml


 


Estimated Blood Loss 10 ml








PATIENT HAS A DIXON:  No


General:  Alert, Oriented X3, Cooperative, mild distress


Abdomen:  Normal bowel sounds, Soft, Other (Mild incisional tenderness wounds 

clean dry and intact)


Labs





Laboratory Tests








Test


 1/21/21


10:50 1/21/21


10:54 1/21/21


11:40 1/21/21


14:00


 


Urine Collection Type Unknown    


 


Urine Color Juanita    


 


Urine Clarity Clear    


 


Urine pH 5.5 (<5.0-8.0)    


 


Urine Specific Gravity


 1.025


(1.000-1.030) 


 


 





 


Urine Protein


 >=300 mg/dL


(NEG-TRACE) 


 


 





 


Urine Glucose (UA)


 100 mg/dL


(NEG) 


 


 





 


Urine Ketones (Stick)


 Negative mg/dL


(NEG) 


 


 





 


Urine Blood Trace (NEG)    


 


Urine Nitrite Negative (NEG)    


 


Urine Bilirubin Small (NEG)    


 


Urine Urobilinogen Dipstick


 1.0 mg/dL (0.2


mg/dL) 


 


 





 


Urine Leukocyte Esterase Negative (NEG)    


 


Urine RBC 0 /HPF (0-2)    


 


Urine WBC 1-4 /HPF (0-4)    


 


Urine Squamous Epithelial


Cells Few /LPF 


 


 


 





 


Urine Amorphous Sediment Present /HPF    


 


Urine Bacteria 0 /HPF (0-FEW)    


 


Urine Hyaline Casts Few /HPF    


 


Urine Granular Casts Few /HPF    


 


Glucose (Fingerstick)


 


 193 mg/dL


(70-99) 


 





 


White Blood Count


 


 


 10.8 x10^3/uL


(4.0-11.0) 





 


Red Blood Count


 


 


 4.01 x10^6/uL


(4.30-5.70) 





 


Hemoglobin


 


 


 12.4 g/dL


(13.0-17.5) 





 


Hematocrit


 


 


 36.4 %


(39.0-53.0) 





 


Mean Corpuscular Volume   91 fL ()  


 


Mean Corpuscular Hemoglobin   31 pg (25-35)  


 


Mean Corpuscular Hemoglobin


Concent 


 


 34 g/dL


(31-37) 





 


Red Cell Distribution Width


 


 


 14.1 %


(11.5-14.5) 





 


Platelet Count


 


 


 228 x10^3/uL


(140-400) 





 


Neutrophils (%) (Auto)   79 % (31-73)  


 


Lymphocytes (%) (Auto)   10 % (24-48)  


 


Monocytes (%) (Auto)   10 % (0-9)  


 


Eosinophils (%) (Auto)   1 % (0-3)  


 


Basophils (%) (Auto)   0 % (0-3)  


 


Neutrophils # (Auto)


 


 


 8.5 x10^3/uL


(1.8-7.7) 





 


Lymphocytes # (Auto)


 


 


 1.1 x10^3/uL


(1.0-4.8) 





 


Monocytes # (Auto)


 


 


 1.1 x10^3/uL


(0.0-1.1) 





 


Eosinophils # (Auto)


 


 


 0.1 x10^3/uL


(0.0-0.7) 





 


Basophils # (Auto)


 


 


 0.0 x10^3/uL


(0.0-0.2) 





 


Sodium Level


 


 


 134 mmol/L


(136-145) 





 


Potassium Level


 


 


 2.9 mmol/L


(3.5-5.1) 





 


Chloride Level


 


 


 97 mmol/L


() 





 


Carbon Dioxide Level


 


 


 26 mmol/L


(21-32) 





 


Anion Gap   11 (6-14)  


 


Blood Urea Nitrogen


 


 


 23 mg/dL


(8-26) 





 


Creatinine


 


 


 1.8 mg/dL


(0.7-1.3) 





 


Estimated GFR


(Cockcroft-Gault) 


 


 36.8 


 





 


BUN/Creatinine Ratio   13 (6-20)  


 


Glucose Level


 


 


 191 mg/dL


(70-99) 





 


Calcium Level


 


 


 8.6 mg/dL


(8.5-10.1) 





 


Magnesium Level


 


 


 1.9 mg/dL


(1.8-2.4) 





 


Total Bilirubin


 


 


 1.2 mg/dL


(0.2-1.0) 





 


Aspartate Amino Transf


(AST/SGOT) 


 


 100 U/L


(15-37) 





 


Alanine Aminotransferase


(ALT/SGPT) 


 


 154 U/L


(16-63) 





 


Alkaline Phosphatase


 


 


 221 U/L


() 





 


Troponin I Quantitative


 


 


 0.022 ng/mL


(0.000-0.055) 0.022 ng/mL


(0.000-0.055)


 


NT-Pro-B-Type Natriuretic


Peptide 


 


 998 pg/mL


(0-449) 





 


Total Protein


 


 


 7.3 g/dL


(6.4-8.2) 





 


Albumin


 


 


 2.5 g/dL


(3.4-5.0) 





 


Albumin/Globulin Ratio   0.5 (1.0-1.7)  


 


Lipase


 


 


 69 U/L


() 





 


Test


 1/21/21


15:08 1/21/21


17:30 1/21/21


18:43 1/21/21


19:30


 


SARS-CoV-2 Antigen (Rapid)


 Negative


(NEGATIVE) 


 


 





 


Coronavirus (PCR)


 


 Not detected


(Not Detected) 


 





 


Glucose (Fingerstick)


 


 


 217 mg/dL


(70-99) 





 


Prothrombin Time


 


 


 


 14.4 SEC


(11.7-14.0)


 


Prothromb Time International


Ratio 


 


 


 1.2 (0.8-1.1) 





 


Sodium Level


 


 


 


 139 mmol/L


(136-145)


 


Potassium Level


 


 


 


 3.2 mmol/L


(3.5-5.1)


 


Chloride Level


 


 


 


 101 mmol/L


()


 


Carbon Dioxide Level


 


 


 


 25 mmol/L


(21-32)


 


Anion Gap    13 (6-14) 


 


Blood Urea Nitrogen


 


 


 


 20 mg/dL


(8-26)


 


Creatinine


 


 


 


 1.6 mg/dL


(0.7-1.3)


 


Estimated GFR


(Cockcroft-Gault) 


 


 


 42.1 





 


Glucose Level


 


 


 


 201 mg/dL


(70-99)


 


Calcium Level


 


 


 


 7.8 mg/dL


(8.5-10.1)


 


Troponin I Quantitative


 


 


 


 0.033 ng/mL


(0.000-0.055)


 


Test


 1/21/21


23:55 1/22/21


04:30 1/22/21


05:55 1/22/21


08:39


 


Glucose (Fingerstick)


 121 mg/dL


(70-99) 


 87 mg/dL


(70-99) 107 mg/dL


(70-99)


 


White Blood Count


 


 7.7 x10^3/uL


(4.0-11.0) 


 





 


Red Blood Count


 


 3.66 x10^6/uL


(4.30-5.70) 


 





 


Hemoglobin


 


 11.6 g/dL


(13.0-17.5) 


 





 


Hematocrit


 


 33.2 %


(39.0-53.0) 


 





 


Mean Corpuscular Volume  91 fL ()   


 


Mean Corpuscular Hemoglobin  32 pg (25-35)   


 


Mean Corpuscular Hemoglobin


Concent 


 35 g/dL


(31-37) 


 





 


Red Cell Distribution Width


 


 14.0 %


(11.5-14.5) 


 





 


Platelet Count


 


 223 x10^3/uL


(140-400) 


 





 


Neutrophils (%) (Auto)  72 % (31-73)   


 


Lymphocytes (%) (Auto)  14 % (24-48)   


 


Monocytes (%) (Auto)  12 % (0-9)   


 


Eosinophils (%) (Auto)  1 % (0-3)   


 


Basophils (%) (Auto)  0 % (0-3)   


 


Neutrophils # (Auto)


 


 5.5 x10^3/uL


(1.8-7.7) 


 





 


Lymphocytes # (Auto)


 


 1.1 x10^3/uL


(1.0-4.8) 


 





 


Monocytes # (Auto)


 


 0.9 x10^3/uL


(0.0-1.1) 


 





 


Eosinophils # (Auto)


 


 0.1 x10^3/uL


(0.0-0.7) 


 





 


Basophils # (Auto)


 


 0.0 x10^3/uL


(0.0-0.2) 


 





 


Sodium Level


 


 141 mmol/L


(136-145) 


 





 


Potassium Level


 


 3.3 mmol/L


(3.5-5.1) 


 





 


Chloride Level


 


 105 mmol/L


() 


 





 


Carbon Dioxide Level


 


 25 mmol/L


(21-32) 


 





 


Anion Gap  11 (6-14)   


 


Blood Urea Nitrogen


 


 16 mg/dL


(8-26) 


 





 


Creatinine


 


 1.5 mg/dL


(0.7-1.3) 


 





 


Estimated GFR


(Cockcroft-Gault) 


 45.4 


 


 





 


BUN/Creatinine Ratio  11 (6-20)   


 


Glucose Level


 


 89 mg/dL


(70-99) 


 





 


Calcium Level


 


 7.4 mg/dL


(8.5-10.1) 


 





 


Total Bilirubin


 


 0.8 mg/dL


(0.2-1.0) 


 





 


Aspartate Amino Transf


(AST/SGOT) 


 95 U/L (15-37) 


 


 





 


Alanine Aminotransferase


(ALT/SGPT) 


 136 U/L


(16-63) 


 





 


Alkaline Phosphatase


 


 268 U/L


() 


 





 


Troponin I Quantitative


 


 0.046 ng/mL


(0.000-0.055) 


 





 


Total Protein


 


 5.8 g/dL


(6.4-8.2) 


 





 


Albumin


 


 2.2 g/dL


(3.4-5.0) 


 





 


Albumin/Globulin Ratio  0.6 (1.0-1.7)   


 


Triglycerides Level


 


 184 mg/dL


(0-150) 


 





 


Cholesterol Level


 


 124 mg/dL


(0-200) 


 





 


LDL Cholesterol, Calculated


 


 76 mg/dL


(0-100) 


 





 


VLDL Cholesterol, Calculated


 


 37 mg/dL


(0-40) 


 





 


Non-HDL Cholesterol Calculated


 


 113 mg/dL


(0-129) 


 





 


HDL Cholesterol


 


 11 mg/dL


(40-60) 


 





 


Cholesterol/HDL Ratio  11.3   


 


Test


 1/22/21


10:54 1/22/21


16:09 1/22/21


20:36 1/23/21


03:30


 


Glucose (Fingerstick)


 159 mg/dL


(70-99) 325 mg/dL


(70-99) 303 mg/dL


(70-99) 





 


White Blood Count


 


 


 


 9.2 x10^3/uL


(4.0-11.0)


 


Red Blood Count


 


 


 


 3.50 x10^6/uL


(4.30-5.70)


 


Hemoglobin


 


 


 


 10.8 g/dL


(13.0-17.5)


 


Hematocrit


 


 


 


 32.5 %


(39.0-53.0)


 


Mean Corpuscular Volume    93 fL () 


 


Mean Corpuscular Hemoglobin    31 pg (25-35) 


 


Mean Corpuscular Hemoglobin


Concent 


 


 


 33 g/dL


(31-37)


 


Red Cell Distribution Width


 


 


 


 14.7 %


(11.5-14.5)


 


Platelet Count


 


 


 


 250 x10^3/uL


(140-400)


 


Neutrophils (%) (Auto)    82 % (31-73) 


 


Lymphocytes (%) (Auto)    10 % (24-48) 


 


Monocytes (%) (Auto)    8 % (0-9) 


 


Eosinophils (%) (Auto)    0 % (0-3) 


 


Basophils (%) (Auto)    0 % (0-3) 


 


Neutrophils # (Auto)


 


 


 


 7.5 x10^3/uL


(1.8-7.7)


 


Lymphocytes # (Auto)


 


 


 


 0.9 x10^3/uL


(1.0-4.8)


 


Monocytes # (Auto)


 


 


 


 0.7 x10^3/uL


(0.0-1.1)


 


Eosinophils # (Auto)


 


 


 


 0.0 x10^3/uL


(0.0-0.7)


 


Basophils # (Auto)


 


 


 


 0.0 x10^3/uL


(0.0-0.2)


 


Sodium Level


 


 


 


 138 mmol/L


(136-145)


 


Potassium Level


 


 


 


 4.3 mmol/L


(3.5-5.1)


 


Chloride Level


 


 


 


 105 mmol/L


()


 


Carbon Dioxide Level


 


 


 


 22 mmol/L


(21-32)


 


Anion Gap    11 (6-14) 


 


Blood Urea Nitrogen


 


 


 


 23 mg/dL


(8-26)


 


Creatinine


 


 


 


 1.8 mg/dL


(0.7-1.3)


 


Estimated GFR


(Cockcroft-Gault) 


 


 


 36.8 





 


Glucose Level


 


 


 


 326 mg/dL


(70-99)


 


Calcium Level


 


 


 


 7.6 mg/dL


(8.5-10.1)








Laboratory Tests








Test


 1/22/21


08:39 1/22/21


10:54 1/22/21


16:09 1/22/21


20:36


 


Glucose (Fingerstick)


 107 mg/dL


(70-99) 159 mg/dL


(70-99) 325 mg/dL


(70-99) 303 mg/dL


(70-99)


 


Test


 1/23/21


03:30 


 


 





 


White Blood Count


 9.2 x10^3/uL


(4.0-11.0) 


 


 





 


Red Blood Count


 3.50 x10^6/uL


(4.30-5.70) 


 


 





 


Hemoglobin


 10.8 g/dL


(13.0-17.5) 


 


 





 


Hematocrit


 32.5 %


(39.0-53.0) 


 


 





 


Mean Corpuscular Volume 93 fL ()    


 


Mean Corpuscular Hemoglobin 31 pg (25-35)    


 


Mean Corpuscular Hemoglobin


Concent 33 g/dL


(31-37) 


 


 





 


Red Cell Distribution Width


 14.7 %


(11.5-14.5) 


 


 





 


Platelet Count


 250 x10^3/uL


(140-400) 


 


 





 


Neutrophils (%) (Auto) 82 % (31-73)    


 


Lymphocytes (%) (Auto) 10 % (24-48)    


 


Monocytes (%) (Auto) 8 % (0-9)    


 


Eosinophils (%) (Auto) 0 % (0-3)    


 


Basophils (%) (Auto) 0 % (0-3)    


 


Neutrophils # (Auto)


 7.5 x10^3/uL


(1.8-7.7) 


 


 





 


Lymphocytes # (Auto)


 0.9 x10^3/uL


(1.0-4.8) 


 


 





 


Monocytes # (Auto)


 0.7 x10^3/uL


(0.0-1.1) 


 


 





 


Eosinophils # (Auto)


 0.0 x10^3/uL


(0.0-0.7) 


 


 





 


Basophils # (Auto)


 0.0 x10^3/uL


(0.0-0.2) 


 


 





 


Sodium Level


 138 mmol/L


(136-145) 


 


 





 


Potassium Level


 4.3 mmol/L


(3.5-5.1) 


 


 





 


Chloride Level


 105 mmol/L


() 


 


 





 


Carbon Dioxide Level


 22 mmol/L


(21-32) 


 


 





 


Anion Gap 11 (6-14)    


 


Blood Urea Nitrogen


 23 mg/dL


(8-26) 


 


 





 


Creatinine


 1.8 mg/dL


(0.7-1.3) 


 


 





 


Estimated GFR


(Cockcroft-Gault) 36.8 


 


 


 





 


Glucose Level


 326 mg/dL


(70-99) 


 


 





 


Calcium Level


 7.6 mg/dL


(8.5-10.1) 


 


 











Problem List


Problems


Medical Problems:


(1) Abdominal pain


Status: Acute  





(2) Cholecystitis


Status: Acute  





(3) Hypokalemia


Status: Acute  





(4) Transaminitis


Status: Acute  








Assessment/Plan


Status post laparoscopic cholecystectomy doing quite well


Surgically stable to be discharged from surgical standpoint


Follow-up with Dr. Glass in 2 weeks


Maintain low fat diet no lifting more than 20 pounds





Justicifation of Admission Dx:


Justifications for Admission:


Justification of Admission Dx:  Yes











KAREL GLASS MD             Jan 23, 2021 07:59

## 2021-01-24 VITALS
SYSTOLIC BLOOD PRESSURE: 154 MMHG | DIASTOLIC BLOOD PRESSURE: 66 MMHG | SYSTOLIC BLOOD PRESSURE: 154 MMHG | DIASTOLIC BLOOD PRESSURE: 66 MMHG

## 2021-01-24 VITALS — SYSTOLIC BLOOD PRESSURE: 154 MMHG | DIASTOLIC BLOOD PRESSURE: 66 MMHG

## 2021-01-24 VITALS — DIASTOLIC BLOOD PRESSURE: 72 MMHG | SYSTOLIC BLOOD PRESSURE: 173 MMHG

## 2021-01-24 LAB
ANION GAP SERPL CALC-SCNC: 9 MMOL/L (ref 6–14)
BASOPHILS # BLD AUTO: 0 X10^3/UL (ref 0–0.2)
BASOPHILS NFR BLD: 1 % (ref 0–3)
BUN SERPL-MCNC: 16 MG/DL (ref 8–26)
CALCIUM SERPL-MCNC: 8.2 MG/DL (ref 8.5–10.1)
CHLORIDE SERPL-SCNC: 108 MMOL/L (ref 98–107)
CO2 SERPL-SCNC: 24 MMOL/L (ref 21–32)
CREAT SERPL-MCNC: 1.3 MG/DL (ref 0.7–1.3)
EOSINOPHIL NFR BLD: 0.3 X10^3/UL (ref 0–0.7)
EOSINOPHIL NFR BLD: 3 % (ref 0–3)
ERYTHROCYTE [DISTWIDTH] IN BLOOD BY AUTOMATED COUNT: 14.6 % (ref 11.5–14.5)
GFR SERPLBLD BASED ON 1.73 SQ M-ARVRAT: 53.5 ML/MIN
GLUCOSE SERPL-MCNC: 202 MG/DL (ref 70–99)
HCT VFR BLD CALC: 34.6 % (ref 39–53)
HGB BLD-MCNC: 11.4 G/DL (ref 13–17.5)
LYMPHOCYTES # BLD: 1.6 X10^3/UL (ref 1–4.8)
LYMPHOCYTES NFR BLD AUTO: 18 % (ref 24–48)
MCH RBC QN AUTO: 31 PG (ref 25–35)
MCHC RBC AUTO-ENTMCNC: 33 G/DL (ref 31–37)
MCV RBC AUTO: 93 FL (ref 79–100)
MONO #: 0.6 X10^3/UL (ref 0–1.1)
MONOCYTES NFR BLD: 7 % (ref 0–9)
NEUT #: 6.4 X10^3/UL (ref 1.8–7.7)
NEUTROPHILS NFR BLD AUTO: 72 % (ref 31–73)
PLATELET # BLD AUTO: 286 X10^3/UL (ref 140–400)
POTASSIUM SERPL-SCNC: 4.1 MMOL/L (ref 3.5–5.1)
RBC # BLD AUTO: 3.71 X10^6/UL (ref 4.3–5.7)
SODIUM SERPL-SCNC: 141 MMOL/L (ref 136–145)
WBC # BLD AUTO: 8.9 X10^3/UL (ref 4–11)

## 2021-01-24 RX ADMIN — METOPROLOL TARTRATE SCH MG: 25 TABLET ORAL at 08:08

## 2021-01-24 RX ADMIN — ASPIRIN SCH MG: 81 TABLET, COATED ORAL at 08:07

## 2021-01-24 RX ADMIN — PIPERACILLIN SODIUM AND TAZOBACTAM SODIUM SCH MLS/HR: 2; .25 INJECTION, POWDER, LYOPHILIZED, FOR SOLUTION INTRAVENOUS at 05:02

## 2021-01-24 RX ADMIN — INSULIN LISPRO SCH UNITS: 100 INJECTION, SOLUTION INTRAVENOUS; SUBCUTANEOUS at 08:22

## 2021-01-24 RX ADMIN — Medication SCH CAP: at 08:06

## 2021-01-24 RX ADMIN — SIMVASTATIN SCH MG: 40 TABLET, FILM COATED ORAL at 08:08

## 2021-01-24 RX ADMIN — OXYCODONE HYDROCHLORIDE AND ACETAMINOPHEN PRN TAB: 5; 325 TABLET ORAL at 11:55

## 2021-01-24 RX ADMIN — OXYCODONE HYDROCHLORIDE AND ACETAMINOPHEN PRN TAB: 5; 325 TABLET ORAL at 08:07

## 2021-01-24 NOTE — RAD
XR CHEST 1V



History: Reason: Shortness of breath 664 / Spl. Instructions:  / History: 



Comparison: January 21, 2021



Findings:

Low lung volumes. Increased patchy bibasilar opacities. Prior mid sternotomy. Unchanged in size. No p
leural effusion. No pneumothorax.



Impression: 

1.  Low lung volumes with increased patchy bibasilar opacities, may represent atelectasis or developi
ng consolidations.



Electronically signed by: Zoltan Mckeon DO (1/24/2021 7:30 AM) [id number]

## 2021-01-24 NOTE — PDOC3
Discharge Summary


Visit Information


Date of Admission:  Jan 21, 2021


Date of Discharge:  Jan 24, 2021


Final Diagnosis


Problems


Medical Problems:


(1) Abdominal pain


Status: Acute  





(2) Cholecystitis


Status: Acute  





(3) Hypokalemia


Status: Acute  





(4) Transaminitis


Status: Acute  











Brief Hospital Course


Allergies





                                    Allergies








Coded Allergies Type Severity Reaction Last Updated Verified


 


  No Known Drug Allergies    5/24/17 No








Vital Signs





Vital Signs








  Date Time  Temp Pulse Resp B/P (MAP) Pulse Ox O2 Delivery O2 Flow Rate FiO2


 


1/24/21 08:08  65  154/66    


 


1/24/21 08:07   17   Nasal Cannula 1.0 


 


1/24/21 07:00 96.2    95   





 96.2       








Lab Results





Laboratory Tests








Test


 1/22/21


10:54 1/22/21


16:09 1/22/21


20:36 1/23/21


03:30


 


Glucose (Fingerstick)


 159 mg/dL


(70-99) 325 mg/dL


(70-99) 303 mg/dL


(70-99) 





 


White Blood Count


 


 


 


 9.2 x10^3/uL


(4.0-11.0)


 


Red Blood Count


 


 


 


 3.50 x10^6/uL


(4.30-5.70)


 


Hemoglobin


 


 


 


 10.8 g/dL


(13.0-17.5)


 


Hematocrit


 


 


 


 32.5 %


(39.0-53.0)


 


Mean Corpuscular Volume    93 fL () 


 


Mean Corpuscular Hemoglobin    31 pg (25-35) 


 


Mean Corpuscular Hemoglobin


Concent 


 


 


 33 g/dL


(31-37)


 


Red Cell Distribution Width


 


 


 


 14.7 %


(11.5-14.5)


 


Platelet Count


 


 


 


 250 x10^3/uL


(140-400)


 


Neutrophils (%) (Auto)    82 % (31-73) 


 


Lymphocytes (%) (Auto)    10 % (24-48) 


 


Monocytes (%) (Auto)    8 % (0-9) 


 


Eosinophils (%) (Auto)    0 % (0-3) 


 


Basophils (%) (Auto)    0 % (0-3) 


 


Neutrophils # (Auto)


 


 


 


 7.5 x10^3/uL


(1.8-7.7)


 


Lymphocytes # (Auto)


 


 


 


 0.9 x10^3/uL


(1.0-4.8)


 


Monocytes # (Auto)


 


 


 


 0.7 x10^3/uL


(0.0-1.1)


 


Eosinophils # (Auto)


 


 


 


 0.0 x10^3/uL


(0.0-0.7)


 


Basophils # (Auto)


 


 


 


 0.0 x10^3/uL


(0.0-0.2)


 


Sodium Level


 


 


 


 138 mmol/L


(136-145)


 


Potassium Level


 


 


 


 4.3 mmol/L


(3.5-5.1)


 


Chloride Level


 


 


 


 105 mmol/L


()


 


Carbon Dioxide Level


 


 


 


 22 mmol/L


(21-32)


 


Anion Gap    11 (6-14) 


 


Blood Urea Nitrogen


 


 


 


 23 mg/dL


(8-26)


 


Creatinine


 


 


 


 1.8 mg/dL


(0.7-1.3)


 


Estimated GFR


(Cockcroft-Gault) 


 


 


 36.8 





 


Glucose Level


 


 


 


 326 mg/dL


(70-99)


 


Calcium Level


 


 


 


 7.6 mg/dL


(8.5-10.1)


 


Test


 1/23/21


11:54 1/23/21


14:51 1/23/21


16:41 1/24/21


05:00


 


Glucose (Fingerstick)


 287 mg/dL


(70-99) 258 mg/dL


(70-99) 372 mg/dL


(70-99) 





 


White Blood Count


 


 


 


 8.9 x10^3/uL


(4.0-11.0)


 


Red Blood Count


 


 


 


 3.71 x10^6/uL


(4.30-5.70)


 


Hemoglobin


 


 


 


 11.4 g/dL


(13.0-17.5)


 


Hematocrit


 


 


 


 34.6 %


(39.0-53.0)


 


Mean Corpuscular Volume    93 fL () 


 


Mean Corpuscular Hemoglobin    31 pg (25-35) 


 


Mean Corpuscular Hemoglobin


Concent 


 


 


 33 g/dL


(31-37)


 


Red Cell Distribution Width


 


 


 


 14.6 %


(11.5-14.5)


 


Platelet Count


 


 


 


 286 x10^3/uL


(140-400)


 


Neutrophils (%) (Auto)    72 % (31-73) 


 


Lymphocytes (%) (Auto)    18 % (24-48) 


 


Monocytes (%) (Auto)    7 % (0-9) 


 


Eosinophils (%) (Auto)    3 % (0-3) 


 


Basophils (%) (Auto)    1 % (0-3) 


 


Neutrophils # (Auto)


 


 


 


 6.4 x10^3/uL


(1.8-7.7)


 


Lymphocytes # (Auto)


 


 


 


 1.6 x10^3/uL


(1.0-4.8)


 


Monocytes # (Auto)


 


 


 


 0.6 x10^3/uL


(0.0-1.1)


 


Eosinophils # (Auto)


 


 


 


 0.3 x10^3/uL


(0.0-0.7)


 


Basophils # (Auto)


 


 


 


 0.0 x10^3/uL


(0.0-0.2)


 


Sodium Level


 


 


 


 141 mmol/L


(136-145)


 


Potassium Level


 


 


 


 4.1 mmol/L


(3.5-5.1)


 


Chloride Level


 


 


 


 108 mmol/L


()


 


Carbon Dioxide Level


 


 


 


 24 mmol/L


(21-32)


 


Anion Gap    9 (6-14) 


 


Blood Urea Nitrogen


 


 


 


 16 mg/dL


(8-26)


 


Creatinine


 


 


 


 1.3 mg/dL


(0.7-1.3)


 


Estimated GFR


(Cockcroft-Gault) 


 


 


 53.5 





 


Glucose Level


 


 


 


 202 mg/dL


(70-99)


 


Calcium Level


 


 


 


 8.2 mg/dL


(8.5-10.1)


 


Test


 1/24/21


08:10 


 


 





 


Glucose (Fingerstick)


 226 mg/dL


(70-99) 


 


 











Laboratory Tests








Test


 1/23/21


11:54 1/23/21


14:51 1/23/21


16:41 1/24/21


05:00


 


Glucose (Fingerstick)


 287 mg/dL


(70-99) 258 mg/dL


(70-99) 372 mg/dL


(70-99) 





 


White Blood Count


 


 


 


 8.9 x10^3/uL


(4.0-11.0)


 


Red Blood Count


 


 


 


 3.71 x10^6/uL


(4.30-5.70)


 


Hemoglobin


 


 


 


 11.4 g/dL


(13.0-17.5)


 


Hematocrit


 


 


 


 34.6 %


(39.0-53.0)


 


Mean Corpuscular Volume    93 fL () 


 


Mean Corpuscular Hemoglobin    31 pg (25-35) 


 


Mean Corpuscular Hemoglobin


Concent 


 


 


 33 g/dL


(31-37)


 


Red Cell Distribution Width


 


 


 


 14.6 %


(11.5-14.5)


 


Platelet Count


 


 


 


 286 x10^3/uL


(140-400)


 


Neutrophils (%) (Auto)    72 % (31-73) 


 


Lymphocytes (%) (Auto)    18 % (24-48) 


 


Monocytes (%) (Auto)    7 % (0-9) 


 


Eosinophils (%) (Auto)    3 % (0-3) 


 


Basophils (%) (Auto)    1 % (0-3) 


 


Neutrophils # (Auto)


 


 


 


 6.4 x10^3/uL


(1.8-7.7)


 


Lymphocytes # (Auto)


 


 


 


 1.6 x10^3/uL


(1.0-4.8)


 


Monocytes # (Auto)


 


 


 


 0.6 x10^3/uL


(0.0-1.1)


 


Eosinophils # (Auto)


 


 


 


 0.3 x10^3/uL


(0.0-0.7)


 


Basophils # (Auto)


 


 


 


 0.0 x10^3/uL


(0.0-0.2)


 


Sodium Level


 


 


 


 141 mmol/L


(136-145)


 


Potassium Level


 


 


 


 4.1 mmol/L


(3.5-5.1)


 


Chloride Level


 


 


 


 108 mmol/L


()


 


Carbon Dioxide Level


 


 


 


 24 mmol/L


(21-32)


 


Anion Gap    9 (6-14) 


 


Blood Urea Nitrogen


 


 


 


 16 mg/dL


(8-26)


 


Creatinine


 


 


 


 1.3 mg/dL


(0.7-1.3)


 


Estimated GFR


(Cockcroft-Gault) 


 


 


 53.5 





 


Glucose Level


 


 


 


 202 mg/dL


(70-99)


 


Calcium Level


 


 


 


 8.2 mg/dL


(8.5-10.1)


 


Test


 1/24/21


08:10 


 


 





 


Glucose (Fingerstick)


 226 mg/dL


(70-99) 


 


 











Brief Hospital Course


Mr. Dhaliwal  is a 75 old male who presented with cholecystitis.  

Consultation was placed to general surgery.  He had laparoscopic surgery on 

1/22.  Discharge was held up by brief period of post-op hypoxia.  Will discharge

patient with short course of Percocet.  Follow-up with general surgery in 2 

weeks.





Discharge Information


Condition at Discharge:  Improved


Follow Up:  Weeks


Disposition/Orders:  D/C to Home


Scheduled


Aspirin/Calcium Carbonate/Mag (Aspirin Buffered 325 Mg Tab) 325 Mg Tablet, 325 

MG PO DAILY, (Reported)


   Entered as Reported by: KAREL PATTON on 2/3/16 1244


Cyclobenzaprine Hcl (Cyclobenzaprine Hcl) 10 Mg Tablet, 1 TAB PO TID, #21


   Prescribed by: LILI CONROY MD on 1/17/21 1228


Insulin Aspart (Novolog Flexpen) 100 Unit/1 Ml Insuln.pen, 18 UNIT SQ BEFORE 

DINNER, #5 Ref 1


   Prescribed by: DAVID EPPS on 2/3/16 0929


Insulin Detemir (Levemir Flextouch) 100 Unit/1 Ml Insuln.pen, 50 UNITS SQ QHS, 

#3 Ref 6


   Prescribed by: DAVID EPPS on 2/3/16 0929


Lisinopril (Lisinopril) 5 Mg Tablet, 5 MG PO DAILY, #30 Ref 6


   Prescribed by: DAVID EPPS on 2/3/16 0929


Metformin Hcl (Metformin Hcl) 1,000 Mg Tablet, 1,000 MG PO DAILYWBKFT for ANTI-

DIABETIC, Ref 0 (Reported)


   Entered as Reported by: DARRON JACK on 6/19/14 1458


Metoprolol Tartrate (Metoprolol Tartrate) 25 Mg Tablet, 25 MG PO BID, #60 Ref 6


   Prescribed by: DAVID EPPS on 2/3/16 0929


   Last Action: Continued on 1/22/21 1450 by DHRUV MAS


Pantoprazole Sodium (Pantoprazole Sodium  **) 40 Mg Tablet.dr, 40 MG PO DAILYAC,

#30 Ref 0


   Prescribed by: DAVID EPPS on 2/3/16 0929


Simvastatin (Simvastatin) 40 Mg Tablet, 40 MG PO DAILY for FOR CHOLESTEROL, #30 

Ref 0 (Reported)


   Entered as Reported by: DARRON JACK on 6/19/14 1457


   Last Action: Continued on 1/22/21 1450 by DHRUV MAS





Discontinued Medications


Hydrocodone/Apap 5-325 (Norco 5-325 Tablet) 1 Each Tablet, 1 TAB PO BID, #60


   Prescribed by: DAVID EPPS on 2/3/16 0929


Hydrocodone/Apap 5-325 (Norco 5-325 Tablet) 1 Each Tablet, 1 TAB PO PRN Q6HRS 

PRN for PAIN, #14 Ref 0


   Prescribed by: AVA BOWDEN, CONCHITA on 8/25/18 2022





Justicifation of Admission Dx:


Justifications for Admission:


Justification of Admission Dx:  Yes











DERIC WALL MD            Jan 24, 2021 10:07

## 2021-01-24 NOTE — PDOC
SURGICAL PROGRESS NOTE


DATE: 1/24/21 


TIME: 08:30


Subjective


Patient doing well no complaints tolerating diet


Vital Signs





Vital Signs








  Date Time  Temp Pulse Resp B/P (MAP) Pulse Ox O2 Delivery O2 Flow Rate FiO2


 


1/24/21 08:08  65  154/66    


 


1/24/21 08:07   17   Nasal Cannula 1.0 


 


1/24/21 03:00 97.4    97   





 97.4       








I&O











Intake and Output 


 


 1/24/21





 07:00


 


Intake Total 1100 ml


 


Output Total 1650 ml


 


Balance -550 ml


 


 


 


Intake Oral 1100 ml


 


Output Urine Total 1650 ml








PATIENT HAS A DIXON:  No


General:  Alert, Oriented X3, Cooperative, mild distress


Abdomen:  Normal bowel sounds, Soft, Other (Mild incisional tenderness wounds 

clean dry and intact)


Labs





Laboratory Tests








Test


 1/22/21


08:39 1/22/21


10:54 1/22/21


16:09 1/22/21


20:36


 


Glucose (Fingerstick)


 107 mg/dL


(70-99) 159 mg/dL


(70-99) 325 mg/dL


(70-99) 303 mg/dL


(70-99)


 


Test


 1/23/21


03:30 1/23/21


11:54 1/23/21


14:51 1/23/21


16:41


 


White Blood Count


 9.2 x10^3/uL


(4.0-11.0) 


 


 





 


Red Blood Count


 3.50 x10^6/uL


(4.30-5.70) 


 


 





 


Hemoglobin


 10.8 g/dL


(13.0-17.5) 


 


 





 


Hematocrit


 32.5 %


(39.0-53.0) 


 


 





 


Mean Corpuscular Volume 93 fL ()    


 


Mean Corpuscular Hemoglobin 31 pg (25-35)    


 


Mean Corpuscular Hemoglobin


Concent 33 g/dL


(31-37) 


 


 





 


Red Cell Distribution Width


 14.7 %


(11.5-14.5) 


 


 





 


Platelet Count


 250 x10^3/uL


(140-400) 


 


 





 


Neutrophils (%) (Auto) 82 % (31-73)    


 


Lymphocytes (%) (Auto) 10 % (24-48)    


 


Monocytes (%) (Auto) 8 % (0-9)    


 


Eosinophils (%) (Auto) 0 % (0-3)    


 


Basophils (%) (Auto) 0 % (0-3)    


 


Neutrophils # (Auto)


 7.5 x10^3/uL


(1.8-7.7) 


 


 





 


Lymphocytes # (Auto)


 0.9 x10^3/uL


(1.0-4.8) 


 


 





 


Monocytes # (Auto)


 0.7 x10^3/uL


(0.0-1.1) 


 


 





 


Eosinophils # (Auto)


 0.0 x10^3/uL


(0.0-0.7) 


 


 





 


Basophils # (Auto)


 0.0 x10^3/uL


(0.0-0.2) 


 


 





 


Sodium Level


 138 mmol/L


(136-145) 


 


 





 


Potassium Level


 4.3 mmol/L


(3.5-5.1) 


 


 





 


Chloride Level


 105 mmol/L


() 


 


 





 


Carbon Dioxide Level


 22 mmol/L


(21-32) 


 


 





 


Anion Gap 11 (6-14)    


 


Blood Urea Nitrogen


 23 mg/dL


(8-26) 


 


 





 


Creatinine


 1.8 mg/dL


(0.7-1.3) 


 


 





 


Estimated GFR


(Cockcroft-Gault) 36.8 


 


 


 





 


Glucose Level


 326 mg/dL


(70-99) 


 


 





 


Calcium Level


 7.6 mg/dL


(8.5-10.1) 


 


 





 


Glucose (Fingerstick)


 


 287 mg/dL


(70-99) 258 mg/dL


(70-99) 372 mg/dL


(70-99)


 


Test


 1/24/21


05:00 1/24/21


08:10 


 





 


White Blood Count


 8.9 x10^3/uL


(4.0-11.0) 


 


 





 


Red Blood Count


 3.71 x10^6/uL


(4.30-5.70) 


 


 





 


Hemoglobin


 11.4 g/dL


(13.0-17.5) 


 


 





 


Hematocrit


 34.6 %


(39.0-53.0) 


 


 





 


Mean Corpuscular Volume 93 fL ()    


 


Mean Corpuscular Hemoglobin 31 pg (25-35)    


 


Mean Corpuscular Hemoglobin


Concent 33 g/dL


(31-37) 


 


 





 


Red Cell Distribution Width


 14.6 %


(11.5-14.5) 


 


 





 


Platelet Count


 286 x10^3/uL


(140-400) 


 


 





 


Neutrophils (%) (Auto) 72 % (31-73)    


 


Lymphocytes (%) (Auto) 18 % (24-48)    


 


Monocytes (%) (Auto) 7 % (0-9)    


 


Eosinophils (%) (Auto) 3 % (0-3)    


 


Basophils (%) (Auto) 1 % (0-3)    


 


Neutrophils # (Auto)


 6.4 x10^3/uL


(1.8-7.7) 


 


 





 


Lymphocytes # (Auto)


 1.6 x10^3/uL


(1.0-4.8) 


 


 





 


Monocytes # (Auto)


 0.6 x10^3/uL


(0.0-1.1) 


 


 





 


Eosinophils # (Auto)


 0.3 x10^3/uL


(0.0-0.7) 


 


 





 


Basophils # (Auto)


 0.0 x10^3/uL


(0.0-0.2) 


 


 





 


Sodium Level


 141 mmol/L


(136-145) 


 


 





 


Potassium Level


 4.1 mmol/L


(3.5-5.1) 


 


 





 


Chloride Level


 108 mmol/L


() 


 


 





 


Carbon Dioxide Level


 24 mmol/L


(21-32) 


 


 





 


Anion Gap 9 (6-14)    


 


Blood Urea Nitrogen


 16 mg/dL


(8-26) 


 


 





 


Creatinine


 1.3 mg/dL


(0.7-1.3) 


 


 





 


Estimated GFR


(Cockcroft-Gault) 53.5 


 


 


 





 


Glucose Level


 202 mg/dL


(70-99) 


 


 





 


Calcium Level


 8.2 mg/dL


(8.5-10.1) 


 


 





 


Glucose (Fingerstick)


 


 226 mg/dL


(70-99) 


 











Laboratory Tests








Test


 1/23/21


11:54 1/23/21


14:51 1/23/21


16:41 1/24/21


05:00


 


Glucose (Fingerstick)


 287 mg/dL


(70-99) 258 mg/dL


(70-99) 372 mg/dL


(70-99) 





 


White Blood Count


 


 


 


 8.9 x10^3/uL


(4.0-11.0)


 


Red Blood Count


 


 


 


 3.71 x10^6/uL


(4.30-5.70)


 


Hemoglobin


 


 


 


 11.4 g/dL


(13.0-17.5)


 


Hematocrit


 


 


 


 34.6 %


(39.0-53.0)


 


Mean Corpuscular Volume    93 fL () 


 


Mean Corpuscular Hemoglobin    31 pg (25-35) 


 


Mean Corpuscular Hemoglobin


Concent 


 


 


 33 g/dL


(31-37)


 


Red Cell Distribution Width


 


 


 


 14.6 %


(11.5-14.5)


 


Platelet Count


 


 


 


 286 x10^3/uL


(140-400)


 


Neutrophils (%) (Auto)    72 % (31-73) 


 


Lymphocytes (%) (Auto)    18 % (24-48) 


 


Monocytes (%) (Auto)    7 % (0-9) 


 


Eosinophils (%) (Auto)    3 % (0-3) 


 


Basophils (%) (Auto)    1 % (0-3) 


 


Neutrophils # (Auto)


 


 


 


 6.4 x10^3/uL


(1.8-7.7)


 


Lymphocytes # (Auto)


 


 


 


 1.6 x10^3/uL


(1.0-4.8)


 


Monocytes # (Auto)


 


 


 


 0.6 x10^3/uL


(0.0-1.1)


 


Eosinophils # (Auto)


 


 


 


 0.3 x10^3/uL


(0.0-0.7)


 


Basophils # (Auto)


 


 


 


 0.0 x10^3/uL


(0.0-0.2)


 


Sodium Level


 


 


 


 141 mmol/L


(136-145)


 


Potassium Level


 


 


 


 4.1 mmol/L


(3.5-5.1)


 


Chloride Level


 


 


 


 108 mmol/L


()


 


Carbon Dioxide Level


 


 


 


 24 mmol/L


(21-32)


 


Anion Gap    9 (6-14) 


 


Blood Urea Nitrogen


 


 


 


 16 mg/dL


(8-26)


 


Creatinine


 


 


 


 1.3 mg/dL


(0.7-1.3)


 


Estimated GFR


(Cockcroft-Gault) 


 


 


 53.5 





 


Glucose Level


 


 


 


 202 mg/dL


(70-99)


 


Calcium Level


 


 


 


 8.2 mg/dL


(8.5-10.1)


 


Test


 1/24/21


08:10 


 


 





 


Glucose (Fingerstick)


 226 mg/dL


(70-99) 


 


 











Problem List


Problems


Medical Problems:


(1) Abdominal pain


Status: Acute  





(2) Cholecystitis


Status: Acute  





(3) Hypokalemia


Status: Acute  





(4) Transaminitis


Status: Acute  








Assessment/Plan


Status post laparoscopic cholecystectomy White count normal tolerating diet


Stable from surgical standpoint


Follow-up Dr. Glass in 2 weeks low-fat diet for 2 weeks no lifting more than 20

pounds for 2 weeks





Justicifation of Admission Dx:


Justifications for Admission:


Justification of Admission Dx:  Yes











KAREL GLASS MD             Jan 24, 2021 08:31

## 2021-01-24 NOTE — PDOC
[facility] PROGRESS NOTE


Date of Service


DOS:


DATE: 1/24/21 


TIME: 09:25





Chief Complaint


Chief Complaint


A/P:


Epigastric abdominal pain - seems to be acute Cholecystitis, but concerning for 

possible prior findings. May also be 


Hypokalemia - will replace, check mag


JOSSE - vasomotor nephropathy, will hydrate gently, monitor renal function


Chest pain - likely GERD vs gastritis vs PUD, however with his CAD history will 

trend troponins, ask cardiology to see


Transaminitis - likely from gallbladder disease vs possible COVID 19, though he 

denies exposure


Elevated troponin - likely demand ischemia, will trend. cardiology consulted


DM2 - with hyperglycemia - will keep Npo, sliding scale


Anemia - likely of chronic disease


CAD s/p CABG x4 2016 - has outpatient f/u, taking meds


Abnormal CXR - will treat as pneumonia, likely gram negative given his age and 

risk factors. Will f/u COVID 19 results





FEN - NPO


PPX - SCDs


FULL CODE


Dispo - inpatient for above





History of Present Illness


History of Present Illness


Mr Dhaliwal is a 75  year old male with history of hypertension, diabetes, 

coronary disease status post CABG x4 who presents to the ED with his daughter 

with epigastric pain and chest pain as well as right flank pain.


Pain is epigastric and substernal, sharp, intermittent, 7/10 with occasional 

radiation to right flank, though it is not currently radiating. Food does not 

affect his pain. No recent sick contacts. He does have some shortness of breath 

that is intermittent. No cough. Occasional chills.


He was seen in the emergency department on Saturday, January 16 for flank pain. 

Nothing makes it better or worse.  He reports that he had a decrease in appetite

.  He said some nausea without vomiting.  Patient denies diarrhea, no fevers, no

syncope headache neck pain.  He has had some intermittent chills. Patient is not

a smoker.  Denies daily drugs or alcohol.  Patient is accompanied by family 

member who helps with history.


EKG appears normal sinus rhythm with ventricular rate of 74 bpm.  CO interval 

194 ms.  QRS duration 96 ms.  QTc 460 ms.  PAC noted.  No acute ST segment 

elevations.


Chest radiograph with suspected focal right upper lobe interstitial infiltrate 

or scarring.


RUQ US with debris within the gallbladder with gallbladder wall thickening and 

pericholecystic edema. Echogenic liver.


Labs significant for WBC 10.8 with left shift, Hb 12.4, platelets 228, , 

K2.9, BUN 23, CR 1.8, glucose 191, albumin 2.5, alk phos 221, , , 

bilirubin 1.2, lipase 69, , troponin 0 0.022 x 2


Admitted for further care.








1/22: Patient seen and evaluated, s/p laparoscopic cholecystectomy.  Patient was

febrile overnight.  Reports some incisional tenderness.  Chest x-ray on 

admission showed suspected focal right upper lobe interstitial infiltrate or 

scarring.  COVID-19  and influenza pending.


1/23: Patient flu and COVID-19 negative.  S/p laparoscopic cholecystectomy, 

cleared by general surgery to discharge. And follow-up with Dr. Anguiano in 2 

weeks.  Patient became hypoxic this afternoon, requiring 1-2 L nasal cannula.  

Suspect postsurgical atelectasis.  Will obtain CXR.  If still hypoxic tomorrow 

morning will obtain 6-minute walk to evaluate for O2.





1/24: He has been cleared by surgery to discharge.  He is breathing comfortably 

on room air.  Will discharge with short course of Percocet.  Follow-up with 

surgery and 2 weeks.  Greater than 30 minutes was spent managing the discharge 

this patient.





Vitals/I&O


Vitals/I&O:





                                   Vital Signs








  Date Time  Temp Pulse Resp B/P (MAP) Pulse Ox O2 Delivery O2 Flow Rate FiO2


 


1/24/21 08:08  65  154/66    


 


1/24/21 08:07   17   Nasal Cannula 1.0 


 


1/24/21 07:00 96.2    95   





 96.2       














                                    I & O   


 


 1/23/21 1/23/21 1/24/21





 15:00 23:00 07:00


 


Intake Total 900 ml  200 ml


 


Output Total 600 ml  1050 ml


 


Balance 300 ml  -850 ml











Physical Exam


General:  Alert, Oriented X3, Cooperative, mild distress


Heart:  Regular rate, No murmurs


Lungs:  Clear


Abdomen:  Normal bowel sounds, Soft, Other (Mild incisional tenderness wounds 

clean dry and intact)


Extremities:  No edema


Skin:  No significant lesion





Labs


Labs:





Laboratory Tests








Test


 1/23/21


11:54 1/23/21


14:51 1/23/21


16:41 1/24/21


05:00


 


Glucose (Fingerstick)


 287 mg/dL


(70-99) 258 mg/dL


(70-99) 372 mg/dL


(70-99) 





 


White Blood Count


 


 


 


 8.9 x10^3/uL


(4.0-11.0)


 


Red Blood Count


 


 


 


 3.71 x10^6/uL


(4.30-5.70)


 


Hemoglobin


 


 


 


 11.4 g/dL


(13.0-17.5)


 


Hematocrit


 


 


 


 34.6 %


(39.0-53.0)


 


Mean Corpuscular Volume    93 fL () 


 


Mean Corpuscular Hemoglobin    31 pg (25-35) 


 


Mean Corpuscular Hemoglobin


Concent 


 


 


 33 g/dL


(31-37)


 


Red Cell Distribution Width


 


 


 


 14.6 %


(11.5-14.5)


 


Platelet Count


 


 


 


 286 x10^3/uL


(140-400)


 


Neutrophils (%) (Auto)    72 % (31-73) 


 


Lymphocytes (%) (Auto)    18 % (24-48) 


 


Monocytes (%) (Auto)    7 % (0-9) 


 


Eosinophils (%) (Auto)    3 % (0-3) 


 


Basophils (%) (Auto)    1 % (0-3) 


 


Neutrophils # (Auto)


 


 


 


 6.4 x10^3/uL


(1.8-7.7)


 


Lymphocytes # (Auto)


 


 


 


 1.6 x10^3/uL


(1.0-4.8)


 


Monocytes # (Auto)


 


 


 


 0.6 x10^3/uL


(0.0-1.1)


 


Eosinophils # (Auto)


 


 


 


 0.3 x10^3/uL


(0.0-0.7)


 


Basophils # (Auto)


 


 


 


 0.0 x10^3/uL


(0.0-0.2)


 


Sodium Level


 


 


 


 141 mmol/L


(136-145)


 


Potassium Level


 


 


 


 4.1 mmol/L


(3.5-5.1)


 


Chloride Level


 


 


 


 108 mmol/L


()


 


Carbon Dioxide Level


 


 


 


 24 mmol/L


(21-32)


 


Anion Gap    9 (6-14) 


 


Blood Urea Nitrogen


 


 


 


 16 mg/dL


(8-26)


 


Creatinine


 


 


 


 1.3 mg/dL


(0.7-1.3)


 


Estimated GFR


(Cockcroft-Gault) 


 


 


 53.5 





 


Glucose Level


 


 


 


 202 mg/dL


(70-99)


 


Calcium Level


 


 


 


 8.2 mg/dL


(8.5-10.1)


 


Test


 1/24/21


08:10 


 


 





 


Glucose (Fingerstick)


 226 mg/dL


(70-99) 


 


 














Assessment and Plan


Assessmemt and Plan


Problems


Medical Problems:


(1) Abdominal pain


Status: Acute  





(2) Cholecystitis


Status: Acute  





(3) Hypokalemia


Status: Acute  





(4) Transaminitis


Status: Acute  











Comment


Review of Relevant


I have reviewed the following items fernando (where applicable) has been applied.





Justifications for Admission


Other Justification














DERIC WALL MD            Jan 24, 2021 09:26

## 2021-01-24 NOTE — NUR
Pt discharged to home with girlfriend. Discharge instructions reviewed with Pearl as well as 
the pt. Verbalized understanding.

## 2021-05-28 ENCOUNTER — HOSPITAL ENCOUNTER (OUTPATIENT)
Dept: HOSPITAL 61 - KCIC | Age: 76
End: 2021-05-28
Attending: INTERNAL MEDICINE
Payer: COMMERCIAL

## 2021-05-28 DIAGNOSIS — K57.30: Primary | ICD-10-CM

## 2021-05-28 DIAGNOSIS — Z90.49: ICD-10-CM

## 2021-05-28 DIAGNOSIS — K76.0: ICD-10-CM

## 2021-05-28 PROCEDURE — 74177 CT ABD & PELVIS W/CONTRAST: CPT

## 2021-05-28 PROCEDURE — 82565 ASSAY OF CREATININE: CPT

## 2021-05-28 RX ADMIN — IOHEXOL ONE ML: 240 INJECTION, SOLUTION INTRATHECAL; INTRAVASCULAR; INTRAVENOUS; ORAL at 11:07

## 2021-05-28 RX ADMIN — IOHEXOL ONE ML: 300 INJECTION, SOLUTION INTRAVENOUS at 11:07

## 2021-05-28 NOTE — [facility]
EXAM: Abdomen and pelvis CT with intravenous contrast.



HISTORY: Pain.



TECHNIQUE: Computed tomographic images of the abdomen and pelvis were obtained following the administ
ration of intravenous contrast. Multiplanar reformatting was performed.



*One or more of the following individualized dose reduction techniques were utilized for this examina
tion:  

1. Automated exposure control.  

2. Adjustment of the mA and/or kV according to patient size.  

3. Use of iterative reconstruction technique.



COMPARISON: None.



FINDINGS: Evaluation of the lower thorax demonstrates cardiomegaly. There aren't median sternotomy ch
anges due to coronary bypass grafting. There is posterior dependent and basilar atelectasis. There is
 a calcified granuloma within the left lower lobe.



There is hepatic steatosis. The gallbladder is absent. The pancreas, spleen and adrenal glands are un
remarkable. There is no suspicious renal lesion or hydronephrosis.



There is no appendicitis. There is no bowel obstruction. There is mild wall thickening involving the 
transverse colon. There is moderate colonic stool. There is distal colonic diverticulosis. There is n
o diverticulitis. The prostate is mildly enlarged. The bladder is unremarkable. There is aortic and a
ortic branch vessel atherosclerosis. There is no aneurysm. There is no lymphadenopathy. There is no s
uspicious or acute osseous finding.



IMPRESSION: 

1. Distal colonic diverticulosis. There is no convincing diverticulitis.

2. Mild wall thickening involving the transverse colon. This may be physiologic or due to the sequela
 of prior inflammation. There is no convincing surrounding stranding to suggest acute colitis.

3. Hepatic steatosis.



Electronically signed by: Ophelia Britt MD (5/28/2021 1:16 PM) [id number]

## 2022-05-02 ENCOUNTER — HOSPITAL ENCOUNTER (OUTPATIENT)
Dept: HOSPITAL 61 - ER | Age: 77
Setting detail: OBSERVATION
LOS: 1 days | Discharge: HOME | End: 2022-05-03
Attending: STUDENT IN AN ORGANIZED HEALTH CARE EDUCATION/TRAINING PROGRAM | Admitting: STUDENT IN AN ORGANIZED HEALTH CARE EDUCATION/TRAINING PROGRAM
Payer: MEDICARE

## 2022-05-02 VITALS — SYSTOLIC BLOOD PRESSURE: 190 MMHG | DIASTOLIC BLOOD PRESSURE: 81 MMHG

## 2022-05-02 VITALS — HEIGHT: 65 IN | WEIGHT: 198.42 LBS | BODY MASS INDEX: 33.06 KG/M2

## 2022-05-02 VITALS — SYSTOLIC BLOOD PRESSURE: 181 MMHG | DIASTOLIC BLOOD PRESSURE: 76 MMHG

## 2022-05-02 VITALS — DIASTOLIC BLOOD PRESSURE: 73 MMHG | SYSTOLIC BLOOD PRESSURE: 175 MMHG

## 2022-05-02 VITALS — SYSTOLIC BLOOD PRESSURE: 157 MMHG | DIASTOLIC BLOOD PRESSURE: 55 MMHG

## 2022-05-02 DIAGNOSIS — Z79.4: ICD-10-CM

## 2022-05-02 DIAGNOSIS — Z79.899: ICD-10-CM

## 2022-05-02 DIAGNOSIS — N18.30: ICD-10-CM

## 2022-05-02 DIAGNOSIS — I50.9: ICD-10-CM

## 2022-05-02 DIAGNOSIS — E78.5: ICD-10-CM

## 2022-05-02 DIAGNOSIS — Z79.82: ICD-10-CM

## 2022-05-02 DIAGNOSIS — M79.89: ICD-10-CM

## 2022-05-02 DIAGNOSIS — Z95.1: ICD-10-CM

## 2022-05-02 DIAGNOSIS — Z87.891: ICD-10-CM

## 2022-05-02 DIAGNOSIS — M79.602: ICD-10-CM

## 2022-05-02 DIAGNOSIS — I13.0: ICD-10-CM

## 2022-05-02 DIAGNOSIS — R07.89: Primary | ICD-10-CM

## 2022-05-02 DIAGNOSIS — Z79.84: ICD-10-CM

## 2022-05-02 DIAGNOSIS — E11.22: ICD-10-CM

## 2022-05-02 DIAGNOSIS — I25.10: ICD-10-CM

## 2022-05-02 DIAGNOSIS — N40.0: ICD-10-CM

## 2022-05-02 DIAGNOSIS — M19.90: ICD-10-CM

## 2022-05-02 LAB
ANION GAP SERPL CALC-SCNC: 10 MMOL/L (ref 6–14)
BASOPHILS # BLD AUTO: 0.1 X10^3/UL (ref 0–0.2)
BASOPHILS NFR BLD: 1 % (ref 0–3)
BUN SERPL-MCNC: 15 MG/DL (ref 8–26)
CALCIUM SERPL-MCNC: 8.6 MG/DL (ref 8.5–10.1)
CHLORIDE SERPL-SCNC: 102 MMOL/L (ref 98–107)
CO2 SERPL-SCNC: 25 MMOL/L (ref 21–32)
CREAT SERPL-MCNC: 1.3 MG/DL (ref 0.7–1.3)
EOSINOPHIL NFR BLD: 0.3 X10^3/UL (ref 0–0.7)
EOSINOPHIL NFR BLD: 6 % (ref 0–3)
ERYTHROCYTE [DISTWIDTH] IN BLOOD BY AUTOMATED COUNT: 14.7 % (ref 11.5–14.5)
GFR SERPLBLD BASED ON 1.73 SQ M-ARVRAT: 53.4 ML/MIN
GLUCOSE SERPL-MCNC: 292 MG/DL (ref 70–99)
HCT VFR BLD CALC: 37.6 % (ref 39–53)
HGB BLD-MCNC: 12.8 G/DL (ref 13–17.5)
LYMPHOCYTES # BLD: 1.5 X10^3/UL (ref 1–4.8)
LYMPHOCYTES NFR BLD AUTO: 27 % (ref 24–48)
MCH RBC QN AUTO: 31 PG (ref 25–35)
MCHC RBC AUTO-ENTMCNC: 34 G/DL (ref 31–37)
MCV RBC AUTO: 92 FL (ref 79–100)
MONO #: 0.5 X10^3/UL (ref 0–1.1)
MONOCYTES NFR BLD: 9 % (ref 0–9)
NEUT #: 3.1 X10^3/UL (ref 1.8–7.7)
NEUTROPHILS NFR BLD AUTO: 56 % (ref 31–73)
PLATELET # BLD AUTO: 216 X10^3/UL (ref 140–400)
POTASSIUM SERPL-SCNC: 3.8 MMOL/L (ref 3.5–5.1)
RBC # BLD AUTO: 4.08 X10^6/UL (ref 4.3–5.7)
SODIUM SERPL-SCNC: 137 MMOL/L (ref 136–145)
WBC # BLD AUTO: 5.5 X10^3/UL (ref 4–11)

## 2022-05-02 PROCEDURE — G0379 DIRECT REFER HOSPITAL OBSERV: HCPCS

## 2022-05-02 PROCEDURE — C8929 TTE W OR WO FOL WCON,DOPPLER: HCPCS

## 2022-05-02 PROCEDURE — 96372 THER/PROPH/DIAG INJ SC/IM: CPT

## 2022-05-02 PROCEDURE — 93306 TTE W/DOPPLER COMPLETE: CPT

## 2022-05-02 PROCEDURE — 80048 BASIC METABOLIC PNL TOTAL CA: CPT

## 2022-05-02 PROCEDURE — G0378 HOSPITAL OBSERVATION PER HR: HCPCS

## 2022-05-02 PROCEDURE — 82962 GLUCOSE BLOOD TEST: CPT

## 2022-05-02 PROCEDURE — 96376 TX/PRO/DX INJ SAME DRUG ADON: CPT

## 2022-05-02 PROCEDURE — 80061 LIPID PANEL: CPT

## 2022-05-02 PROCEDURE — 71045 X-RAY EXAM CHEST 1 VIEW: CPT

## 2022-05-02 PROCEDURE — 84443 ASSAY THYROID STIM HORMONE: CPT

## 2022-05-02 PROCEDURE — 73030 X-RAY EXAM OF SHOULDER: CPT

## 2022-05-02 PROCEDURE — 99285 EMERGENCY DEPT VISIT HI MDM: CPT

## 2022-05-02 PROCEDURE — 85025 COMPLETE CBC W/AUTO DIFF WBC: CPT

## 2022-05-02 PROCEDURE — 93005 ELECTROCARDIOGRAM TRACING: CPT

## 2022-05-02 PROCEDURE — 84484 ASSAY OF TROPONIN QUANT: CPT

## 2022-05-02 PROCEDURE — 36415 COLL VENOUS BLD VENIPUNCTURE: CPT

## 2022-05-02 PROCEDURE — 83880 ASSAY OF NATRIURETIC PEPTIDE: CPT

## 2022-05-02 PROCEDURE — 96374 THER/PROPH/DIAG INJ IV PUSH: CPT

## 2022-05-02 RX ADMIN — PANTOPRAZOLE SODIUM SCH MG: 40 TABLET, DELAYED RELEASE ORAL at 17:23

## 2022-05-02 RX ADMIN — INSULIN LISPRO SCH UNITS: 100 INJECTION, SOLUTION INTRAVENOUS; SUBCUTANEOUS at 17:30

## 2022-05-02 RX ADMIN — ASPIRIN SCH MG: 81 TABLET, COATED ORAL at 17:23

## 2022-05-02 RX ADMIN — SENNOSIDES AND DOCUSATE SODIUM SCH TAB: 8.6; 5 TABLET ORAL at 21:36

## 2022-05-02 RX ADMIN — HEPARIN SODIUM SCH UNIT: 5000 INJECTION, SOLUTION INTRAVENOUS; SUBCUTANEOUS at 17:29

## 2022-05-02 RX ADMIN — HEPARIN SODIUM SCH UNIT: 5000 INJECTION, SOLUTION INTRAVENOUS; SUBCUTANEOUS at 21:49

## 2022-05-02 RX ADMIN — HYDRALAZINE HYDROCHLORIDE PRN MG: 20 INJECTION INTRAMUSCULAR; INTRAVENOUS at 21:37

## 2022-05-02 RX ADMIN — METOPROLOL TARTRATE SCH MG: 25 TABLET ORAL at 21:36

## 2022-05-02 NOTE — RAD
XR CHEST 1V 



INDICATION: chest pain 



COMPARISON STUDY: None.



FINDINGS:



Lungs: Elevation of the right hemidiaphragm. No pulmonary mass or consolidation. The tracheobronchial
 tree and hilar structures are normal.



Pleura: No pleural effusion or pneumothorax.



Heart and Mediastinum: Cardiomegaly. CABG. Atherosclerosis of thoracic aorta.



IMPRESSION:  

No focal airspace disease.



Electronically signed by: Clive Chaney MD (5/2/2022 11:15 AM) [id number]

## 2022-05-02 NOTE — PDOC2
KRISTY NICHOLE APRN 5/2/22 1246:


CARDIAC CONSULT


DATE OF CONSULT


Date of Consult


DATE: 5/2/22 


TIME: 12:44





REASON FOR CONSULT


Reason for Consult:


Chest pain





REFERRING PHYSICIAN


Referring Physician:


Dr. White





SOURCE


Source:  Chart review, Patient





HISTORY OF PRESENT ILLNESS


HISTORY OF PRESENT ILLNESS


This is a 75 yo male who presented secondary to chest pain. Patient reports 

intermittent pressure in the central chest over the last 2 weeks. Associated 

with pain down the left arm. No associated dizziness, diaphoresis, palpitations,

or shortness of breath. Has also had some mild LE edema bilaterally over the 

last couple of week. Does have a history of CAD s/p CABG. Reports compliance 

with medications.





PAST MEDICAL HISTORY


Cardiovascular:  CAD, CHF, HTN, Hyperlipidemia


GI:  GERD


Musculoskeletal:  Osteoarthritis


Renal/:  Chronic renal insuff, Benign prostatic enlarg.


Endocrine:  Diabetes





PAST SURGICAL HISTORY


Past Surgical History:  CABG





FAMILY HISTORY


Family History:  Diabetes, Hypertension





SOCIAL HISTORY


Smoke:  Quit


ALCOHOL:  none


Drugs:  None


Lives:  Alone





ALLERGIES


ALLERGIES:  


Coded Allergies:  


     No Known Drug Allergies (Unverified , 5/2/22)





ROS


Review of System


14 point ROS conducted with pertinent positives noted above in HPI





PHYSICAL EXAM


General:  Alert, Oriented X3, Cooperative, No acute distress


HEENT:  Atraumatic


Lungs:  Clear to auscultation


Heart:  Regular rate


Abdomen:  Soft, No tenderness


Extremities:  Other (1+ bilateral LE edema )


Skin:  No significant lesion


Neuro:  Normal speech, Sensation intact


Psych/Mental Status:  Mental status NL, Mood NL


MUSCULOSKELETAL:  Osteoarthritic changes both hands





VITALS/I&O


VITALS/I&O:





                                   Vital Signs








  Date Time  Temp Pulse Resp B/P (MAP) Pulse Ox O2 Delivery O2 Flow Rate FiO2


 


5/2/22 11:42  57 16 172/74 (106) 95 Room Air  


 


5/2/22 10:16 98.2       





 98.2       











LABS


Lab:





                                Laboratory Tests








Test


 5/2/22


10:29


 


White Blood Count


 5.5 x10^3/uL


(4.0-11.0)


 


Red Blood Count


 4.08 x10^6/uL


(4.30-5.70)  L


 


Hemoglobin


 12.8 g/dL


(13.0-17.5)  L


 


Hematocrit


 37.6 %


(39.0-53.0)  L


 


Mean Corpuscular Volume


 92 fL ()





 


Mean Corpuscular Hemoglobin 31 pg (25-35)  


 


Mean Corpuscular Hemoglobin


Concent 34 g/dL


(31-37)


 


Red Cell Distribution Width


 14.7 %


(11.5-14.5)  H


 


Platelet Count


 216 x10^3/uL


(140-400)


 


Neutrophils (%) (Auto) 56 % (31-73)  


 


Lymphocytes (%) (Auto) 27 % (24-48)  


 


Monocytes (%) (Auto) 9 % (0-9)  


 


Eosinophils (%) (Auto) 6 % (0-3)  H


 


Basophils (%) (Auto) 1 % (0-3)  


 


Neutrophils # (Auto)


 3.1 x10^3/uL


(1.8-7.7)


 


Lymphocytes # (Auto)


 1.5 x10^3/uL


(1.0-4.8)


 


Monocytes # (Auto)


 0.5 x10^3/uL


(0.0-1.1)


 


Eosinophils # (Auto)


 0.3 x10^3/uL


(0.0-0.7)


 


Basophils # (Auto)


 0.1 x10^3/uL


(0.0-0.2)


 


Sodium Level


 137 mmol/L


(136-145)


 


Potassium Level


 3.8 mmol/L


(3.5-5.1)


 


Chloride Level


 102 mmol/L


()


 


Carbon Dioxide Level


 25 mmol/L


(21-32)


 


Anion Gap 10 (6-14)  


 


Blood Urea Nitrogen


 15 mg/dL


(8-26)


 


Creatinine


 1.3 mg/dL


(0.7-1.3)


 


Estimated GFR


(Cockcroft-Gault) 53.4  





 


Glucose Level


 292 mg/dL


(70-99)  H


 


Calcium Level


 8.6 mg/dL


(8.5-10.1)


 


Troponin I High Sensitivity


 63 ng/L (4-75)





 


NT-Pro-B-Type Natriuretic


Peptide 425 pg/mL


(0-449)





                                Laboratory Tests


5/2/22 10:29








                                Laboratory Tests


5/2/22 10:29











ECHOCARDIOGRAM


ECHOCARDIOGRAM


<Conclusion>


The left ventricular systolic function is normal.


The Ejection Fraction is 55-60%.


There is normal LV segmental wall motion.


Transmitral Doppler flow pattern is Grade I-abnormal relaxation pattern.


Trace mitral regurgitation.


Trace tricuspid regurgitation with an estimated PAP of 33 mmHg.


There is no evidence of significant pericardial effusion.





DATE:     11/19/20 [id number] 0





STRESS TEST


STRESS TEST


Conclusion


1. Regadenoson cardioisotope stress test showed moderate sized inferolateral 

wall infarct without any ischemia.


2. Abnormal septal motion most probably secondary to postoperative state.  The 

ejection fraction is calculated at 48%.


3. Low risk for cardiac events.





DATE:     11/19/20 [id number] 0





HEART CATH


HEART CATH


Hemodynamics.


Left ventricle pressure 128/12, aortic root pressure 126/64.


Coronaries.


Left main.  The left main had a proximal 40-50% lesion which caused dampening 

with 6 [facility] diagnostic catheters.


Left anterior descending.  The LAD had a proximal 60% and a mid subtotal lesion.

 There was extensive collateralization throughout the left system.


Left circumflex.  The left circumflex had a mid 70% lesion.


Right coronary artery.  Right coronary had a mid 50% lesion in the distal 

subtotal to total occlusion.  There was extensive collateralization from the 

left system.


Left ventriculogram.


The left ventricle showed inferior to the inferior apical hypokinesis.  Ejection

fraction was estimated at 40%.  There was mild to moderate mitral regurgitation.








<Conclusion>


3 vessel coronary artery disease with a left main lesion in a diabetic patient.


Decreased LV systolic function with an ejection fraction of 40%.


Mild to moderate mitral regurgitation.





DATE:     01/27/16 0957





ASSESSMENT/PLAN


ASSESSMENT/PLAN


1.  Chest pain, mixed features; initial trop negative.


2.  Accelerated hypertension


3.  CAD s/p CABG 2016; Echo 2019: EF 50-55%. MPI 11/20 without ischemia .


4.  Hyperlipidemia 


5.  Diabetes, II


6.  CKD; stable 








Recommendations





Trend troponin


Echocardiogram


Secondary prevention


Resume home antiHTN therapy. Titrate as warranted


Hydralazine IV PRN


Will need further ischemic evaluation. Inpatient vs outpatient pending troponin 

trend, echocardiogram


Supportive care





SERGO LOBATO MD 5/3/22 0909:


CARDIAC CONSULT


ASSESSMENT/PLAN


ASSESSMENT/PLAN


Late entry for 5/2/2022


Patient seen and examined.  Agree with above nurse practitioner note.  Patient 

reports that he [place] the [place] problems.

 I suspect his left arm pain may be transient hypotension related chest pain.  

Medical therapy for now and consider outpatient ischemic testing.











KRISTY NICHOLE            May 2, 2022 12:46


SERGO LOBATO MD        May 3, 2022 09:09

## 2022-05-02 NOTE — PDOC1
History and Physical


Date of Service:


DOS:


DATE: 5/2/22 


TIME: 15:37





Chief Complaint:


Chief Complain:


Chest pressure left arm pain lower extremity swelling





History of Present Illness:


HPI:


76-year-old male presented to the emergency room complaining of chest pressure. 

Says he has been having this feeling intermittently over the past 2 weeks.  No 

apparent cause.  Also reporting to me some left arm pain not certain if it is 

radiation from the chest pressure.  Denies any shortness of breath with this.  

All while this has been happening he has had worsening bilateral lower extremity

edema.  Has previously had a CABG.  Follows with Dr. Lucero.





Emergency room chest pain had notably improved.  Given high risk and heart score

was admitted for further work-up.





Past Medical/Surgical History:


PMH/PSH:


CABG





Allergies:


Allergies:  


Coded Allergies:  


     No Known Drug Allergies (Unverified , 5/2/22)





Family History:


Family History:


Hypertension





Social History:


Social History:


Denies alcohol tobacco drug use





Current Medications:


Current Medications





Current Medications


Insulin Human Lispro (HumaLOG) 0-9 UNITS TIDWMEALS SQ ;  Start 5/2/22 at 17:00


Dextrose (Dextrose 50%-Water Syringe) 12.5 gm PRN Q15MIN  PRN IV SEE COMMENTS;  

Start 5/2/22 at 15:00


Dextrose (Iv Dextrose 5%) 250 ml PRN Q15MIN  PRN IV SEE COMMENTS;  Start 5/2/22 

at 15:00





Active Scripts


Active


Reported


Atorvastatin Calcium 40 Mg Tablet 1 Tab PO QHS


Levothyroxine Sodium 88 Mcg Tablet 1 Tab PO DAILY


Amlodipine Besylate 10 Mg Tablet 10 Mg PO DAILY


Vitamin D3 ** (Vitamin D) 25 Mcg Tablet 25 Mcg PO WEEKLY


     1,000 UNITS = 25 MCG


Flomax (Tamsulosin Hcl) 0.4 Mg Cap.er.24h 0.4 Mg PO DAILY


Protonix (Pantoprazole Sodium) 20 Mg Tablet.dr 2 Tab PO DAILY


Metoprolol Tartrate 25 Mg Tablet 1 Tab PO BID


Finasteride 5 Mg Tablet 1 Tab PO DAILY


Metformin Hcl 1,000 Mg Tablet 1,000 Mg PO BIDWMEALS


Lantus Solostar (Insulin Glargine,Hum.rec.anlog) 100 Unit/1 Ml Insuln.pen 65 

Unit SQ QHS





ROS:


Review of Systems


Review of System


Unless noted in HPI 14 point review of systems is negative





Physical Exam:


Vital Signs:





Vital Signs








  Date Time  Temp Pulse Resp B/P (MAP) Pulse Ox O2 Delivery O2 Flow Rate FiO2


 


5/2/22 15:00 97.5 68 19 190/81 (117) 97 Room Air  





 97.5       








Physcial Exam:


GEN:   No apparent distress.  Alert and oriented


HEENT:   Normal cephalic, atraumatic, external auditory canals are patent


EYES:   Extraocular muscles are intact, pupil are equally round and reactive to 

light and accommodation


MUSCULOSKELETAL:  Well developed , well nourished, good range of motion


ENDOCRINE:   No thyromegaly was palpated


LYMPHATICS:   No cervical chain or axillary nodes were noted


HEMATOPOIETIC:  No bruising


NECK:   Supple, no JVD, no thyromegaly was noted


LUNGS:   Clear to auscultation in all lung fields without rhonchi or wheezing


HEART:    RRR, S!, S2 present.  Peripheral pulses intact, no obvious murmurs 

noted


ABDOMEN:   Soft, nontender.  Positive bowel sounds, no organomegaly, normal 

bowel sounds


EXTREMITIES:   Without clubbing, cyanosis, or edema.  Pedal pulses intact.  

Negative Homans sign


NEUROLOGIC:   Normal speech and tone.  A&O x 3, moves all extremities, no 

obvious focal deficits


PSYCHIATRIC:   Normal affect, normal mood. Stable


SKIN:   No ulcerations or rashes, good skin turgor, no jaundice


VASCULAR:   Good capillary refill, neurovascular bundle appears to be intact





Labs:


Labs:





Laboratory Tests








Test


 5/2/22


10:29


 


White Blood Count


 5.5 x10^3/uL


(4.0-11.0)


 


Red Blood Count


 4.08 x10^6/uL


(4.30-5.70)


 


Hemoglobin


 12.8 g/dL


(13.0-17.5)


 


Hematocrit


 37.6 %


(39.0-53.0)


 


Mean Corpuscular Volume 92 fL () 


 


Mean Corpuscular Hemoglobin 31 pg (25-35) 


 


Mean Corpuscular Hemoglobin


Concent 34 g/dL


(31-37)


 


Red Cell Distribution Width


 14.7 %


(11.5-14.5)


 


Platelet Count


 216 x10^3/uL


(140-400)


 


Neutrophils (%) (Auto) 56 % (31-73) 


 


Lymphocytes (%) (Auto) 27 % (24-48) 


 


Monocytes (%) (Auto) 9 % (0-9) 


 


Eosinophils (%) (Auto) 6 % (0-3) 


 


Basophils (%) (Auto) 1 % (0-3) 


 


Neutrophils # (Auto)


 3.1 x10^3/uL


(1.8-7.7)


 


Lymphocytes # (Auto)


 1.5 x10^3/uL


(1.0-4.8)


 


Monocytes # (Auto)


 0.5 x10^3/uL


(0.0-1.1)


 


Eosinophils # (Auto)


 0.3 x10^3/uL


(0.0-0.7)


 


Basophils # (Auto)


 0.1 x10^3/uL


(0.0-0.2)


 


Sodium Level


 137 mmol/L


(136-145)


 


Potassium Level


 3.8 mmol/L


(3.5-5.1)


 


Chloride Level


 102 mmol/L


()


 


Carbon Dioxide Level


 25 mmol/L


(21-32)


 


Anion Gap 10 (6-14) 


 


Blood Urea Nitrogen


 15 mg/dL


(8-26)


 


Creatinine


 1.3 mg/dL


(0.7-1.3)


 


Estimated GFR


(Cockcroft-Gault) 53.4 





 


Glucose Level


 292 mg/dL


(70-99)


 


Calcium Level


 8.6 mg/dL


(8.5-10.1)


 


Troponin I High Sensitivity 63 ng/L (4-75) 


 


NT-Pro-B-Type Natriuretic


Peptide 425 pg/mL


(0-449)








Laboratory Tests








Test


 5/2/22


10:29


 


White Blood Count


 5.5 x10^3/uL


(4.0-11.0)


 


Red Blood Count


 4.08 x10^6/uL


(4.30-5.70)


 


Hemoglobin


 12.8 g/dL


(13.0-17.5)


 


Hematocrit


 37.6 %


(39.0-53.0)


 


Mean Corpuscular Volume 92 fL () 


 


Mean Corpuscular Hemoglobin 31 pg (25-35) 


 


Mean Corpuscular Hemoglobin


Concent 34 g/dL


(31-37)


 


Red Cell Distribution Width


 14.7 %


(11.5-14.5)


 


Platelet Count


 216 x10^3/uL


(140-400)


 


Neutrophils (%) (Auto) 56 % (31-73) 


 


Lymphocytes (%) (Auto) 27 % (24-48) 


 


Monocytes (%) (Auto) 9 % (0-9) 


 


Eosinophils (%) (Auto) 6 % (0-3) 


 


Basophils (%) (Auto) 1 % (0-3) 


 


Neutrophils # (Auto)


 3.1 x10^3/uL


(1.8-7.7)


 


Lymphocytes # (Auto)


 1.5 x10^3/uL


(1.0-4.8)


 


Monocytes # (Auto)


 0.5 x10^3/uL


(0.0-1.1)


 


Eosinophils # (Auto)


 0.3 x10^3/uL


(0.0-0.7)


 


Basophils # (Auto)


 0.1 x10^3/uL


(0.0-0.2)


 


Sodium Level


 137 mmol/L


(136-145)


 


Potassium Level


 3.8 mmol/L


(3.5-5.1)


 


Chloride Level


 102 mmol/L


()


 


Carbon Dioxide Level


 25 mmol/L


(21-32)


 


Anion Gap 10 (6-14) 


 


Blood Urea Nitrogen


 15 mg/dL


(8-26)


 


Creatinine


 1.3 mg/dL


(0.7-1.3)


 


Estimated GFR


(Cockcroft-Gault) 53.4 





 


Glucose Level


 292 mg/dL


(70-99)


 


Calcium Level


 8.6 mg/dL


(8.5-10.1)


 


Troponin I High Sensitivity 63 ng/L (4-75) 


 


NT-Pro-B-Type Natriuretic


Peptide 425 pg/mL


(0-449)











Assessment/Plan


Assessment/Plan


Chest pain, left arm pain, bilateral lower extremity swelling.  History CABG





-Admit to obs for chest pain work-up.


-Cardiology consulted in emergency room.


-Continue to trend troponins overnight


-We will check a left shoulder x-ray as chest pain has improved but patient is 

reporting arm pain still


-DVT prophylaxis


-Cardiac diet


-Home meds as indicated.





Justifications for Admission


Other Justification














YAMILEX FARRIS MD          May 2, 2022 15:37

## 2022-05-02 NOTE — RAD
EXAMINATION: Left shoulder radiograph.



VIEWS: 3



COMPARISON: 5/2/2022



INDICATION:76 years, Male, left shoulder pain.



FINDINGS: 

No acute fracture, dislocation or subluxation. 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
cture. No soft tissue swelling. Visualized lung is unremarkable.



IMPRESSION:

No acute osseous abnormality.



Electronically signed by: Frankie Teran MD (5/2/2022 4:45 PM) [facility]

## 2022-05-02 NOTE — PHYS DOC
Adult General


Chief Complaint


Chief Complaint:  CHEST PAIN





HPI


HPI





Patient is a 76  year old male who presents with chest pressure.  Patient has 

history of CABG many years earlier.  He comes to the ER today for evaluation of 

chest heaviness which she has been having over the last week.  Heaviness is 

intermittent and he has not been able to identify any aggravating or alleviating

factors.  No radiation of the discomfort.  Also complains of some worsening 

lower extremity edema but no dyspnea on exertion or orthopnea.  Takes aspirin 

every day and already took his medications today.  Does not currently complain 

of chest pain or heaviness during the initial interview.  No fever, cough, 

chills





Review of Systems


Review of Systems





Constitutional: Denies fever or chills 


Eyes: Denies change in visual acuity


HENT: Denies nasal congestion 


Respiratory: Denies cough or shortness of breath 


Cardiovascular: No additional information not addressed in HPI 


GI: Denies abdominal pain, nausea


:  Denies dysuria or hematuria 


Musculoskeletal: Denies back pain 


Integument: Denies rash or skin lesions 


Neurologic: Denies headache


Endocrine: Denies 





All other systems were reviewed and found to be within normal limits, except as 

documented in this note.





Allergies


Allergies





Allergies








Coded Allergies Type Severity Reaction Last Updated Verified


 


  No Known Drug Allergies    22 No











Physical Exam


Physical Exam





Constitutional: Well developed, well nourished, no acute distress, non-toxic 

appearance


HENT: Moist mucous membranes, nares patent,


Eyes: PERRLA, EOMI 


Neck: Normal range of motion, 


Cardiovascular:Heart rate regular rhythm, no murmur 


Lungs & Thorax:  Bilateral breath sounds clear to auscultation 


Abdomen: Bowel sounds normal, soft, no tenderness 


Skin: Warm, dry, no erythema, no rash 


Back: Normal ROM 


Extremities: No tenderness, no cyanosis, no clubbing, ROM intact, 1+ pitting 

edema bilateral LE's 


Neurologic: Alert and oriented X 3, normal motor function


Psychologic: Affect normal





Current Patient Data


Vital Signs





                                   Vital Signs








  Date Time  Temp Pulse Resp B/P (MAP) Pulse Ox O2 Delivery O2 Flow Rate FiO2


 


22 11:03  58 16 189/77 (114) 95 Room Air  


 


22 10:16 98.2       





 98.2       








Lab Values





                                Laboratory Tests








Test


 22


10:29


 


White Blood Count


 5.5 x10^3/uL


(4.0-11.0)


 


Red Blood Count


 4.08 x10^6/uL


(4.30-5.70)  L


 


Hemoglobin


 12.8 g/dL


(13.0-17.5)  L


 


Hematocrit


 37.6 %


(39.0-53.0)  L


 


Mean Corpuscular Volume


 92 fL ()





 


Mean Corpuscular Hemoglobin 31 pg (25-35)  


 


Mean Corpuscular Hemoglobin


Concent 34 g/dL


(31-37)


 


Red Cell Distribution Width


 14.7 %


(11.5-14.5)  H


 


Platelet Count


 216 x10^3/uL


(140-400)


 


Neutrophils (%) (Auto) 56 % (31-73)  


 


Lymphocytes (%) (Auto) 27 % (24-48)  


 


Monocytes (%) (Auto) 9 % (0-9)  


 


Eosinophils (%) (Auto) 6 % (0-3)  H


 


Basophils (%) (Auto) 1 % (0-3)  


 


Neutrophils # (Auto)


 3.1 x10^3/uL


(1.8-7.7)


 


Lymphocytes # (Auto)


 1.5 x10^3/uL


(1.0-4.8)


 


Monocytes # (Auto)


 0.5 x10^3/uL


(0.0-1.1)


 


Eosinophils # (Auto)


 0.3 x10^3/uL


(0.0-0.7)


 


Basophils # (Auto)


 0.1 x10^3/uL


(0.0-0.2)


 


Sodium Level


 137 mmol/L


(136-145)


 


Potassium Level


 3.8 mmol/L


(3.5-5.1)


 


Chloride Level


 102 mmol/L


()


 


Carbon Dioxide Level


 25 mmol/L


(21-32)


 


Anion Gap 10 (6-14)  


 


Blood Urea Nitrogen


 15 mg/dL


(8-26)


 


Creatinine


 1.3 mg/dL


(0.7-1.3)


 


Estimated GFR


(Cockcroft-Gault) 53.4  





 


Glucose Level


 292 mg/dL


(70-99)  H


 


Calcium Level


 8.6 mg/dL


(8.5-10.1)


 


Troponin I High Sensitivity


 63 ng/L (4-75)





 


NT-Pro-B-Type Natriuretic


Peptide 425 pg/mL


(0-449)





                                Laboratory Tests


22 10:29








                                Laboratory Tests


22 10:29











EKG


EKG


10:15: EKG completed.  Normal sinus rhythm.  Nonspecific T wave abnormalities 

with very subtle T wave inversions in leads V4 through V6.  No ST elevations to 

suggest STEMI





Radiology/Procedures


Radiology/Procedures


[]





Course & Med Decision Making


Course & Med Decision Making


Pertinent Labs and Imaging studies reviewed. (See chart for details)





Seen and examined on arrival to his room.  No acute distress and not actively 

having chest pain.  Standard work-up is ordered.  EKG does not reveal STEMI.





HEART Score: 6


Hx:  1


EK


Age:  2


Risk:  2


Trop:  1





11:32:  No chest pain during the ER course but patient has hx of HLD, s/p CABG, 

HTN, DM.  Elevated HEART score and will request admission for inpatient 

cardiology evaluation.  Mr. Pichardo is agreeable to this plan of care.  He states 

had quadruple bypass at this hospital ten years earlier and is followed by Dr. Lucero.





Dragon Disclaimer


Dragon Disclaimer


This electronic medical record was generated, in whole or in part, using a voice

 recognition dictation system.





Departure


Departure


Referrals:  


WHITLEY SOFIA MD (PCP)











BRADLEY ASHTON DO              May 2, 2022 10:13

## 2022-05-03 VITALS — SYSTOLIC BLOOD PRESSURE: 135 MMHG | DIASTOLIC BLOOD PRESSURE: 55 MMHG

## 2022-05-03 VITALS — DIASTOLIC BLOOD PRESSURE: 66 MMHG | SYSTOLIC BLOOD PRESSURE: 154 MMHG

## 2022-05-03 VITALS — DIASTOLIC BLOOD PRESSURE: 75 MMHG | SYSTOLIC BLOOD PRESSURE: 155 MMHG

## 2022-05-03 VITALS — DIASTOLIC BLOOD PRESSURE: 65 MMHG | SYSTOLIC BLOOD PRESSURE: 164 MMHG

## 2022-05-03 VITALS — SYSTOLIC BLOOD PRESSURE: 163 MMHG | DIASTOLIC BLOOD PRESSURE: 68 MMHG

## 2022-05-03 LAB
CHOLEST SERPL-MCNC: 158 MG/DL (ref 0–200)
CHOLEST/HDLC SERPL: 4.1 {RATIO}
HDLC SERPL-MCNC: 39 MG/DL (ref 40–60)
LDLC: 74 MG/DL (ref 0–100)
TRIGL SERPL-MCNC: 223 MG/DL (ref 0–150)
VLDLC: 45 MG/DL (ref 0–40)

## 2022-05-03 RX ADMIN — SENNOSIDES AND DOCUSATE SODIUM SCH TAB: 8.6; 5 TABLET ORAL at 08:31

## 2022-05-03 RX ADMIN — PANTOPRAZOLE SODIUM SCH MG: 40 TABLET, DELAYED RELEASE ORAL at 08:32

## 2022-05-03 RX ADMIN — ASPIRIN SCH MG: 81 TABLET, COATED ORAL at 08:32

## 2022-05-03 RX ADMIN — INSULIN LISPRO SCH UNITS: 100 INJECTION, SOLUTION INTRAVENOUS; SUBCUTANEOUS at 08:39

## 2022-05-03 RX ADMIN — HEPARIN SODIUM SCH UNIT: 5000 INJECTION, SOLUTION INTRAVENOUS; SUBCUTANEOUS at 06:33

## 2022-05-03 RX ADMIN — HEPARIN SODIUM SCH UNIT: 5000 INJECTION, SOLUTION INTRAVENOUS; SUBCUTANEOUS at 06:00

## 2022-05-03 RX ADMIN — METOPROLOL TARTRATE SCH MG: 25 TABLET ORAL at 08:31

## 2022-05-03 RX ADMIN — INSULIN LISPRO SCH UNITS: 100 INJECTION, SOLUTION INTRAVENOUS; SUBCUTANEOUS at 12:59

## 2022-05-03 RX ADMIN — HYDRALAZINE HYDROCHLORIDE PRN MG: 20 INJECTION INTRAMUSCULAR; INTRAVENOUS at 06:26

## 2022-05-03 RX ADMIN — HEPARIN SODIUM SCH UNIT: 5000 INJECTION, SOLUTION INTRAVENOUS; SUBCUTANEOUS at 14:00

## 2022-05-03 NOTE — NUR
The patient has denied any chest pain, reports 2/10 on left arm discomfort, did receive 
hydralazine for elevated b/p earlier,continuing to monitor.

## 2022-05-03 NOTE — PDOC
HILARIA CARRANZA APRN 5/3/22 1234:


CARDIO Progress Notes


Date and Time


Date of Service


5/3/2022


Time of Evaluation


1220





Subjective


Subjective:  No Chest Pain, No shortness of breath, No Palpitations





Vitals


Vitals





Vital Signs








  Date Time  Temp Pulse Resp B/P (MAP) Pulse Ox O2 Delivery O2 Flow Rate FiO2


 


5/3/22 11:00 97.9 62 18 135/55 (81) 96 Room Air  





 97.9       








Weight


Weight [ ]





Input and Output


Intake and Output











Intake and Output 


 


 5/3/22





 07:00


 


Intake Total 800 ml


 


Balance 800 ml


 


 


 


Intake Oral 800 ml


 


# Voids 2











Laboratory


Labs





Laboratory Tests








Test


 5/2/22


15:01 5/2/22


17:13 5/2/22


18:48 5/2/22


21:19


 


Troponin I High Sensitivity 74 ng/L (4-75)   82 ng/L (4-75)  


 


Glucose (Fingerstick)


 


 291 mg/dL


(70-99) 


 351 mg/dL


(70-99)


 


Test


 5/3/22


07:12 5/3/22


08:20 5/3/22


11:43 





 


Glucose (Fingerstick)


 206 mg/dL


(70-99) 


 245 mg/dL


(70-99) 





 


Triglycerides Level


 


 223 mg/dL


(0-150) 


 





 


Cholesterol Level


 


 158 mg/dL


(0-200) 


 





 


LDL Cholesterol, Calculated


 


 74 mg/dL


(0-100) 


 





 


VLDL Cholesterol, Calculated


 


 45 mg/dL


(0-40) 


 





 


Non-HDL Cholesterol Calculated


 


 119 mg/dL


(0-129) 


 





 


HDL Cholesterol


 


 39 mg/dL


(40-60) 


 





 


Cholesterol/HDL Ratio  4.1   











Physical Exam


HEENT:  Neck Supple W Full Motion


Chest:  Symmetric


LUNGS:  Clear to Auscultation


Heart:  S1S2, RRR (SR)


Abdomen:  Soft N/T


Extremities:  No Edema, No Calf Tenderness


Neurology:  alert, oriented, follow commands





Assessment


Assessment


1.  Chest pain, mixed features. Good METS. EF and WM nml per TTE


2.  Accelerated hypertension: improved


3.  CAD s/p CABG 2016; Echo 2019: EF 50-55%. MPI 11/20 without ischemia .


4.  Hyperlipidemia 


5.  Diabetes, II


6.  CKD; stable possbly stage 3


7.  Mild troponin elevation: possibly from uncontrolled HTN





Recommendations


Continue metoprolol. Continue norvasc. Will add low dose lisinopril. HBPM. 

Titrate as an outpt


Secondary prevention


Outpt MPI


Follow up in office as scheduled on Nimo 15 at 9:45 AM





Justicifation of Admission Dx:


Justifications for Admission:


Justification of Admission Dx:  Yes





SERGO LOBATO MD 5/3/22 1454:


CARDIO Progress Notes


Plan


Plan


The patient was seen and interviewed as well as examined at the bedside. The 

chart was reviewed. The case was discussed. Agree with the plan of care.











HILARIA CARRANZA           May 3, 2022 12:34


SERGO LOBATO MD        May 3, 2022 14:54

## 2022-05-03 NOTE — PDOC
[facility] PROGRESS NOTE


Date of Service


DOS:


DATE: 5/3/22 


TIME: 10:19





Chief Complaint


Chief Complaint


Chest pain, left arm pain, bilateral lower extremity swelling.  History CABG





History of Present Illness


History of Present Illness


5/3/2022


Patient seen


Discussed with RN


Discussed with case management


Chart reviewed


Will likely discharge this afternoon if okay with





Vitals/I&O


Vitals/I&O:





                                   Vital Signs








  Date Time  Temp Pulse Resp B/P (MAP) Pulse Ox O2 Delivery O2 Flow Rate FiO2


 


5/3/22 08:32  67  155/75    


 


5/3/22 07:00   19   Room Air  


 


5/3/22 03:00 98.0    94   





 98.0       














                                    I & O 0  


 


 5/2/22 5/2/22 5/3/22





 15:00 23:00 07:00


 


Intake Total  300 ml 500 ml


 


Balance  300 ml 500 ml











Physical Exam


General:  Alert, Oriented X3, Cooperative, No acute distress


Heart:  Regular rate


Abdomen:  Soft, No tenderness


Extremities:  Other (1+ bilateral LE edema )


Skin:  No significant lesion





Labs


Labs:





Laboratory Tests








Test


 5/2/22


10:29 5/2/22


15:01 5/2/22


17:13 5/2/22


18:48


 


White Blood Count


 5.5 x10^3/uL


(4.0-11.0) 


 


 





 


Red Blood Count


 4.08 x10^6/uL


(4.30-5.70) 


 


 





 


Hemoglobin


 12.8 g/dL


(13.0-17.5) 


 


 





 


Hematocrit


 37.6 %


(39.0-53.0) 


 


 





 


Mean Corpuscular Volume 92 fL ()    


 


Mean Corpuscular Hemoglobin 31 pg (25-35)    


 


Mean Corpuscular Hemoglobin


Concent 34 g/dL


(31-37) 


 


 





 


Red Cell Distribution Width


 14.7 %


(11.5-14.5) 


 


 





 


Platelet Count


 216 x10^3/uL


(140-400) 


 


 





 


Neutrophils (%) (Auto) 56 % (31-73)    


 


Lymphocytes (%) (Auto) 27 % (24-48)    


 


Monocytes (%) (Auto) 9 % (0-9)    


 


Eosinophils (%) (Auto) 6 % (0-3)    


 


Basophils (%) (Auto) 1 % (0-3)    


 


Neutrophils # (Auto)


 3.1 x10^3/uL


(1.8-7.7) 


 


 





 


Lymphocytes # (Auto)


 1.5 x10^3/uL


(1.0-4.8) 


 


 





 


Monocytes # (Auto)


 0.5 x10^3/uL


(0.0-1.1) 


 


 





 


Eosinophils # (Auto)


 0.3 x10^3/uL


(0.0-0.7) 


 


 





 


Basophils # (Auto)


 0.1 x10^3/uL


(0.0-0.2) 


 


 





 


Sodium Level


 137 mmol/L


(136-145) 


 


 





 


Potassium Level


 3.8 mmol/L


(3.5-5.1) 


 


 





 


Chloride Level


 102 mmol/L


() 


 


 





 


Carbon Dioxide Level


 25 mmol/L


(21-32) 


 


 





 


Anion Gap 10 (6-14)    


 


Blood Urea Nitrogen


 15 mg/dL


(8-26) 


 


 





 


Creatinine


 1.3 mg/dL


(0.7-1.3) 


 


 





 


Estimated GFR


(Cockcroft-Gault) 53.4 


 


 


 





 


Glucose Level


 292 mg/dL


(70-99) 


 


 





 


Calcium Level


 8.6 mg/dL


(8.5-10.1) 


 


 





 


Troponin I High Sensitivity 63 ng/L (4-75)  74 ng/L (4-75)   82 ng/L (4-75) 


 


NT-Pro-B-Type Natriuretic


Peptide 425 pg/mL


(0-449) 


 


 





 


Thyroid Stimulating Hormone


(TSH) 4.454 uIU/mL


(0.358-3.74) 


 


 





 


Glucose (Fingerstick)


 


 


 291 mg/dL


(70-99) 





 


Test


 5/2/22


21:19 5/3/22


07:12 5/3/22


08:20 





 


Glucose (Fingerstick)


 351 mg/dL


(70-99) 206 mg/dL


(70-99) 


 





 


Triglycerides Level


 


 


 223 mg/dL


(0-150) 





 


Cholesterol Level


 


 


 158 mg/dL


(0-200) 





 


LDL Cholesterol, Calculated


 


 


 74 mg/dL


(0-100) 





 


VLDL Cholesterol, Calculated


 


 


 45 mg/dL


(0-40) 





 


Non-HDL Cholesterol Calculated


 


 


 119 mg/dL


(0-129) 





 


HDL Cholesterol


 


 


 39 mg/dL


(40-60) 





 


Cholesterol/HDL Ratio   4.1  











Assessment and Plan


Assessmemt and Plan


Chest pain, left arm pain, bilateral lower extremity swelling.  History CABG





Plan is discharge if okay with cardiology





Comment


Review of Relevant


I have reviewed the following items marbin (where applicable) has been applied.


Medications:





Current Medications








 Medications


  (Trade)  Dose


 Ordered  Sig/Noris


 Route


 PRN Reason  Start Time


 Stop Time Status Last Admin


Dose Admin


 


 Insulin Human


 Lispro


  (HumaLOG)  0-9 UNITS  TIDWMEALS


 SQ


   5/2/22 17:00


    5/3/22 08:39





 


 Senna/Docusate


 Sodium


  (Senna Plus)  1 tab  BID


 PO


   5/2/22 21:00


    5/3/22 08:31





 


 Heparin Sodium


  (Porcine)


  (Heparin Sodium)  5,000 unit  Q8HRS


 SQ


   5/2/22 16:00


    5/3/22 06:33





 


 Amlodipine


 Besylate


  (Norvasc)  10 mg  DAILY


 PO


   5/2/22 16:00


    5/3/22 08:32





 


 Atorvastatin


 Calcium


  (Lipitor)  40 mg  QHS


 PO


   5/2/22 21:00


    5/2/22 21:36





 


 Finasteride


  (Proscar)  5 mg  DAILY


 PO


   5/3/22 09:00


    5/3/22 08:31





 


 Levothyroxine


 Sodium


  (Synthroid)  88 mcg  DAILY06


 PO


   5/3/22 06:00


    5/3/22 06:25





 


 Metoprolol


 Tartrate


  (Lopressor)  25 mg  BID


 PO


   5/2/22 21:00


    5/3/22 08:31





 


 Tamsulosin HCl


  (Flomax)  0.4 mg  DAILY


 PO


   5/3/22 09:00


    5/3/22 08:30





 


 Insulin Glargine


  (Lantus Syringe)  65 unit  QHS


 SQ


   5/2/22 21:00


    5/2/22 21:48





 


 Metformin HCl


  (Glucophage)  1,000 mg  BIDWMEALS


 PO


   5/2/22 17:00


    5/3/22 08:33





 


 Pantoprazole


 Sodium


  (Protonix)  40 mg  DAILYAC


 PO


   5/2/22 16:30


    5/3/22 08:32





 


 Hydralazine HCl


  (Apresoline Inj)  10 mg  PRN Q4HRS  PRN


 IVP


 ELEVATED BP, SEE COMMENTS  5/2/22 15:45


    5/3/22 06:26





 


 Aspirin


  (Ecotrin)  81 mg  DAILYWBKFT


 PO


   5/2/22 17:00


    5/3/22 08:32














Justifications for Admission


Other Justification














TANK RIVERS III DO            May 3, 2022 10:20

## 2022-05-03 NOTE — EKG
[facility]

              [address] [place] [address]

Test Date:    2022               Test Time:    10:12:59

Pat Name:     CAREY RUIZ               Department:   

Patient ID:   [id number]           Room:          

Gender:       M                        Technician:   

:          [id number]               Requested By: BRADLEY ASHTON

Order Number: [id number]           Reading MD:     

                                 Measurements

Intervals                              Axis          

Rate:         60                       P:            -51

KS:           268                      QRS:          71

QRSD:         92                       T:            154

QT:           412                                    

QTc:          416                                    

                           Interpretive Statements

SINUS RHYTHM

PROLONGED KS INTERVAL

QRS(T) CONTOUR ABNORMALITY

CONSISTENT WITH INFERIOR INFARCT

PROBABLY OLD

T ABNORMALITY IN ANTEROLATERAL LEADS

ABNORMAL ECG

RI6.01

No previous ECG available for comparison

## 2022-05-04 NOTE — DS
DATE OF DISCHARGE: 05/03/2022

ADMITTING DIAGNOSIS:  Chest pain.



DISCHARGE DIAGNOSES:  Atypical chest pain, history of coronary bypass surgery in

2016, accelerated hypertension, hyperlipidemia, diabetes, chronic kidney 

disease.



CONSULTS:  Cardiology.



PROCEDURES:  None.



HOSPITAL COURSE:  The patient is a pleasant middle-aged male who presented with 

chest pain.  He had known coronary artery disease.  He was admitted.  We 

consulted Cardiology, trended his troponins, did serial EKGs, got an echo. 

Yesterday, I saw and examined him.  He is doing well.  Cardiology was okay with 

him going home, will be discharged home.



DISPOSITION:  Home.



ACTIVITY:  As tolerated.



DIET:  Low sodium.



DISCHARGE MEDICATIONS:  Please see the MRAD.  Lisinopril 5 a day, amlodipine 10 

a day, atorvastatin 40 a day, vitamin D3, finasteride 5 a day, Lantus insulin 65

at bedtime, Synthroid 88 a day, metformin 1000 b.i.d., metoprolol 25 b.i.d., 

Protonix 20 a day and Flomax 0.4 a day.



Total time 34 minutes.







JHON/KERI

DR: Minda   DD: 05/04/2022 08:56

DT: 05/04/2022 09:39   TID: [id number]